# Patient Record
Sex: FEMALE | Race: WHITE
[De-identification: names, ages, dates, MRNs, and addresses within clinical notes are randomized per-mention and may not be internally consistent; named-entity substitution may affect disease eponyms.]

---

## 2019-03-06 ENCOUNTER — HOSPITAL ENCOUNTER (INPATIENT)
Dept: HOSPITAL 97 - ER | Age: 84
LOS: 12 days | Discharge: HOME HEALTH | DRG: 378 | End: 2019-03-18
Attending: FAMILY MEDICINE | Admitting: INTERNAL MEDICINE
Payer: COMMERCIAL

## 2019-03-06 VITALS — BODY MASS INDEX: 19.1 KG/M2

## 2019-03-06 DIAGNOSIS — I69.820: ICD-10-CM

## 2019-03-06 DIAGNOSIS — F32.9: ICD-10-CM

## 2019-03-06 DIAGNOSIS — N18.3: ICD-10-CM

## 2019-03-06 DIAGNOSIS — I65.21: ICD-10-CM

## 2019-03-06 DIAGNOSIS — Z95.0: ICD-10-CM

## 2019-03-06 DIAGNOSIS — K29.70: ICD-10-CM

## 2019-03-06 DIAGNOSIS — M54.16: ICD-10-CM

## 2019-03-06 DIAGNOSIS — K44.9: ICD-10-CM

## 2019-03-06 DIAGNOSIS — Z91.81: ICD-10-CM

## 2019-03-06 DIAGNOSIS — E78.5: ICD-10-CM

## 2019-03-06 DIAGNOSIS — G81.91: ICD-10-CM

## 2019-03-06 DIAGNOSIS — E03.9: ICD-10-CM

## 2019-03-06 DIAGNOSIS — N17.9: ICD-10-CM

## 2019-03-06 DIAGNOSIS — K26.4: Primary | ICD-10-CM

## 2019-03-06 DIAGNOSIS — D62: ICD-10-CM

## 2019-03-06 DIAGNOSIS — G45.9: ICD-10-CM

## 2019-03-06 DIAGNOSIS — K20.9: ICD-10-CM

## 2019-03-06 DIAGNOSIS — I12.9: ICD-10-CM

## 2019-03-06 LAB
BUN BLD-MCNC: 43 MG/DL (ref 7–18)
GLUCOSE SERPLBLD-MCNC: 122 MG/DL (ref 74–106)
HCT VFR BLD CALC: 26.9 % (ref 36–45)
INR BLD: 0.97
LYMPHOCYTES # SPEC AUTO: 1 K/UL (ref 0.7–4.9)
PMV BLD: 8.1 FL (ref 7.6–11.3)
POTASSIUM SERPL-SCNC: 4.3 MMOL/L (ref 3.5–5.1)
RBC # BLD: 2.86 M/UL (ref 3.86–4.86)

## 2019-03-06 PROCEDURE — 82962 GLUCOSE BLOOD TEST: CPT

## 2019-03-06 PROCEDURE — 85730 THROMBOPLASTIN TIME PARTIAL: CPT

## 2019-03-06 PROCEDURE — 99285 EMERGENCY DEPT VISIT HI MDM: CPT

## 2019-03-06 PROCEDURE — 97112 NEUROMUSCULAR REEDUCATION: CPT

## 2019-03-06 PROCEDURE — 81003 URINALYSIS AUTO W/O SCOPE: CPT

## 2019-03-06 PROCEDURE — 70553 MRI BRAIN STEM W/O & W/DYE: CPT

## 2019-03-06 PROCEDURE — 95816 EEG AWAKE AND DROWSY: CPT

## 2019-03-06 PROCEDURE — 93880 EXTRACRANIAL BILAT STUDY: CPT

## 2019-03-06 PROCEDURE — 85025 COMPLETE CBC W/AUTO DIFF WBC: CPT

## 2019-03-06 PROCEDURE — 93306 TTE W/DOPPLER COMPLETE: CPT

## 2019-03-06 PROCEDURE — 85014 HEMATOCRIT: CPT

## 2019-03-06 PROCEDURE — 97124 MASSAGE THERAPY: CPT

## 2019-03-06 PROCEDURE — 97163 PT EVAL HIGH COMPLEX 45 MIN: CPT

## 2019-03-06 PROCEDURE — 71045 X-RAY EXAM CHEST 1 VIEW: CPT

## 2019-03-06 PROCEDURE — 80053 COMPREHEN METABOLIC PANEL: CPT

## 2019-03-06 PROCEDURE — 85610 PROTHROMBIN TIME: CPT

## 2019-03-06 PROCEDURE — 86850 RBC ANTIBODY SCREEN: CPT

## 2019-03-06 PROCEDURE — 36430 TRANSFUSION BLD/BLD COMPNT: CPT

## 2019-03-06 PROCEDURE — 85018 HEMOGLOBIN: CPT

## 2019-03-06 PROCEDURE — 70496 CT ANGIOGRAPHY HEAD: CPT

## 2019-03-06 PROCEDURE — 97116 GAIT TRAINING THERAPY: CPT

## 2019-03-06 PROCEDURE — 70498 CT ANGIOGRAPHY NECK: CPT

## 2019-03-06 PROCEDURE — 36415 COLL VENOUS BLD VENIPUNCTURE: CPT

## 2019-03-06 PROCEDURE — 86900 BLOOD TYPING SEROLOGIC ABO: CPT

## 2019-03-06 PROCEDURE — 93005 ELECTROCARDIOGRAM TRACING: CPT

## 2019-03-06 PROCEDURE — 84132 ASSAY OF SERUM POTASSIUM: CPT

## 2019-03-06 PROCEDURE — 83735 ASSAY OF MAGNESIUM: CPT

## 2019-03-06 PROCEDURE — 86901 BLOOD TYPING SEROLOGIC RH(D): CPT

## 2019-03-06 PROCEDURE — 70450 CT HEAD/BRAIN W/O DYE: CPT

## 2019-03-06 PROCEDURE — 70544 MR ANGIOGRAPHY HEAD W/O DYE: CPT

## 2019-03-06 PROCEDURE — 97166 OT EVAL MOD COMPLEX 45 MIN: CPT

## 2019-03-06 PROCEDURE — 80048 BASIC METABOLIC PNL TOTAL CA: CPT

## 2019-03-06 PROCEDURE — 97530 THERAPEUTIC ACTIVITIES: CPT

## 2019-03-06 PROCEDURE — 82274 ASSAY TEST FOR BLOOD FECAL: CPT

## 2019-03-06 NOTE — RAD REPORT
EXAM DESCRIPTION:  CT - Ct Stroke Brain Wo Cont - 3/6/2019 6:37 pm

 

CLINICAL HISTORY:  aphasia

 

COMPARISON:  None

 

TECHNIQUE:  Computed axial tomography of the head was obtained. IV contrast was not requested.

 

All CT scans are performed using dose optimization technique as appropriate and may include automated
 exposure control or mA/KV adjustment according to patient size.

 

FINDINGS:  An intracranial  bleed is not seen .

 

The ventricles are normal in caliber. Vascular calcifications

 

No extra-axial fluid collection is noted. Moderate to marked low-density areas within periventricular
, deep and subcortical white matter likely represent ischemic changes secondary to small vessel disea
se.

 

Fluid within the sinuses/ mastoids is not seen.

 

IMPRESSION:  No acute intracranial abnormality is seen. If patient's symptoms persist  MRI of the bra
in would be recommended.

 

Luis from the emergency room notified 6:45 p.m. March 6, 2019

## 2019-03-06 NOTE — EDPHYS
Physician Documentation                                                                           

 [facility]                                                                

Name: Medina Fernández                                                                              

Age: 85 yrs                                                                                       

Sex: Female                                                                                       

: 1934                                                                                   

MRN: [id number]                                                                                   

Arrival Date: 2019                                                                          

Time: 18:25                                                                                       

Account#: [id number]                                                                            

Bed 5                                                                                             

Private MD:                                                                                       

ED Physician Darren Menchaca                                                                       

HPI:                                                                                              

                                                                                             

18:28 This 85 yrs old  Female presents to ER via Unassigned with complaints of S/S   kdr 

      of Possible Stroke, Fall Injury.                                                            

18:28 The patient's problem is reported as weakness, that is generalized, Per EMS family      kdr 

      reports episodes of right sided weakness and the patient had a transient episode of         

      Right sided weakness during transport. It was completely resolved on arrival to the ED.     

      She relates that she has been generally weak today. She has no other focal c/o at this      

      time and has an NIH SS of ) on arrival. Onset: The symptoms/episode began/occurred at       

      an unknown time. Duration: The episodes are intermittent. Context: the episode(s) was       

      witnessed. The symptoms are alleviated by nothing. The symptoms are aggravated by           

      moving head, changing position. Associated signs and symptoms: Pertinent positives:         

      weakness. Severity of symptoms: At their worst the symptoms were mild moderate just         

      prior to arrival, in the emergency department the symptoms are unchanged. The patient       

      has experienced similar episodes in the past, several times, The patient states that        

      she has had numerous falls today. States she gets weak and "crumples" to the floor.         

                                                                                                  

Historical:                                                                                       

- Allergies:                                                                                      

18:42 Phenergan;                                                                              aa5 

- PMHx:                                                                                           

18:42 Myocardial infarction; TIA; Thyroid problem;                                            aa5 

- PSHx:                                                                                           

18:42 pacemaker; Hysterectomy; Thyroidectomy;                                                 aa5 

                                                                                                  

- Immunization history:: Adult Immunizations up to date, Flu vaccine is up to date.               

- Ebola Screening: : No symptoms or risks identified at this time.                                

- Social history:: Smoking status: Patient/guardian denies using tobacco,                         

  Patient/guardian denies using alcohol, street drugs.                                            

                                                                                                  

                                                                                                  

ROS:                                                                                              

18:28 Constitutional: Negative for fever, chills, and weight loss, Eyes: Negative for injury, kdr 

      pain, redness, and discharge, ENT: Negative for injury, pain, and discharge, Neck:          

      Negative for injury, pain, and swelling, Cardiovascular: Negative for chest pain,           

      palpitations, and edema, Respiratory: Negative for shortness of breath, cough,              

      wheezing, and pleuritic chest pain, Abdomen/GI: Negative for abdominal pain, nausea,        

      vomiting, diarrhea, and constipation, Back: Negative for injury and pain, : Negative      

      for injury, bleeding, discharge, and swelling, MS/Extremity: Negative for injury and        

      deformity, Skin: Negative for injury, rash, and discoloration, Psych: Negative for          

      depression, anxiety, suicide ideation, homicidal ideation, and hallucinations,              

      Allergy/Immunology: Negative for hives, rash, and allergies, Endocrine: Negative for        

      neck swelling, polydipsia, polyuria, polyphagia, and marked weight changes,                 

      Hematologic/Lymphatic: Negative for swollen nodes, abnormal bleeding, and unusual           

      bruising.                                                                                   

18:28 Neuro: Positive for weakness, Patient states general but EMS/Family report right sided      

      with some speech impairment .                                                               

                                                                                                  

Exam:                                                                                             

18:28 Constitutional:  This is a well developed, well nourished patient who is awake, alert,  kdr 

      and in no acute distress. Head/Face:  Normocephalic, atraumatic. Eyes:  Pupils equal        

      round and reactive to light, extra-ocular motions intact.  Lids and lashes normal.          

      Conjunctiva and sclera are non-icteric and not injected.  Cornea within normal limits.      

      Periorbital areas with no swelling, redness, or edema. ENT:  Nares patent. No nasal         

      discharge, no septal abnormalities noted.  Tympanic membranes are normal and external       

      auditory canals are clear.  Oropharynx with no redness, swelling, or masses, exudates,      

      or evidence of obstruction, uvula midline.  Mucous membranes moist. Neck:  Trachea          

      midline, no thyromegaly or masses palpated, and no cervical lymphadenopathy.  Supple,       

      full range of motion without nuchal rigidity, or vertebral point tenderness.  No            

      Meningismus. Chest/axilla:  Normal chest wall appearance and motion.  Nontender with no     

      deformity.  No lesions are appreciated. Cardiovascular:  Regular rate and rhythm with a     

      normal S1 and S2.  No gallops, murmurs, or rubs.  Normal PMI, no JVD.  No pulse             

      deficits. Respiratory:  Lungs have equal breath sounds bilaterally, clear to                

      auscultation and percussion.  No rales, rhonchi or wheezes noted.  No increased work of     

      breathing, no retractions or nasal flaring. Abdomen/GI:  Soft, non-tender, with normal      

      bowel sounds.  No distension or tympany.  No guarding or rebound.  No evidence of           

      tenderness throughout. Back:  No spinal tenderness.  No costovertebral tenderness.          

      Full range of motion. Skin:  Warm, dry with normal turgor.  Normal color with no            

      rashes, no lesions, and no evidence of cellulitis.  She has minor abrasions on her legs     

      MS/ Extremity:  Pulses equal, no cyanosis.  Neurovascular intact.  Full, normal range       

      of motion. Neuro:  Awake and alert, GCS 15, oriented to person, place, time, and            

      situation.  Cranial nerves II-XII grossly intact.  Motor strength 5/5 in all                

      extremities.  Sensory grossly intact.  Cerebellar exam normal.  Normal gait. Psych:         

      Awake, alert, with orientation to person, place and time.  Behavior, mood, and affect       

      are within normal limits.                                                                   

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 no acute findings on CT.                      ps1 

                                                                                                  

Vital Signs:                                                                                      

18:35  / 76; Pulse 77; Resp 16 S; Temp 97.7(O); Pulse Ox 100% on R/A; Pain 6/10;        aa5 

19:00  / 57; Pulse 74; Resp 15; Pulse Ox 99% ;                                          rr5 

19:35  / 51; Pulse 77; Resp 12; Pulse Ox 98% ;                                          rr5 

20:30  / 70; Pulse 75; Resp 16; Pulse Ox 99% ;                                          rr5 

21:30  / 65; Pulse 79; Resp 17; Pulse Ox 99% ;                                          rr5 

22:30  / 57; Pulse 92; Resp 19; Pulse Ox 98% ;                                          rr5 

23:30  / 63; Pulse 100; Resp 17; Pulse Ox 99% ;                                         rr5 

03/07                                                                                             

00:00  / 60; Pulse 87; Resp 17; Temp 98; Pulse Ox 100% ;                                rr5 

                                                                                                  

NIH Stroke Scale Scores:                                                                          

0306                                                                                             

18:25 NIHSS Score: 0                                                                          aa5 

18:28 NIHSS Score: 0                                                                          kdr 

                                                                                                  

MDM:                                                                                              

18:28 Data reviewed: vital signs, nurses notes.                                               kdr 

19:43 ED course: patient signed out to me at shift change by Dr. Menchaca. NIH 0. Reported     ps1 

      syncope vs seizure vs stroke vs Nabor's. Reportedly slumped over and appeared to become      

      syncopal then had aphasia and right UE weakness now completely resolved. TPA not given      

      2/2 improving NIH. Kandace on call and stroke MRI being performed. .                       

19:52 Patient medically screened.                                                             ps1 

20:09 ED course: Notified that patient has a pacemaker that is not compatible with MRI. Will  ps1 

      order CTA head and neck. .                                                                  

                                                                                                  

                                                                                             

18:32 Order name: Basic Metabolic Panel; Complete Time: 19:52                                                                                                                              

18:32 Order name: CBC with Diff; Complete Time: 19:52                                                                                                                                      

18:32 Order name: Protime (+inr); Complete Time: 19:52                                                                                                                                     

18:32 Order name: Ptt, Activated; Complete Time: 19:52                                                                                                                                     

18:32 Order name: CT Stroke Brain w/o Contrast; Complete Time: 19:00                                                                                                                       

18:32 Order name: Stroke CXR 1 View; Complete Time: 19:52                                                                                                                                  

18:32 Order name: EKG; Complete Time: 18:33                                                                                                                                                

18:32 Order name: Accucheck; Complete Time: 18:33                                                                                                                                          

19:39 Order name: MRA Head Wo Cont                                                            EDMS

                                                                                             

19:39 Order name: Brain W/Wo Cont                                                             EDMS

                                                                                             

19:39 Order name: MRA Neck W/Wo Cont                                                          EDMS

                                                                                             

20:12 Order name: CT Neck Angio; Complete Time: 21:18                                         ps1 

                                                                                             

20:12 Order name: CT Head Angio; Complete Time: 21:18                                         ps1 

                                                                                             

18:32 Order name: Cardiac monitoring; Complete Time: 18:41                                                                                                                                 

18:32 Order name: EKG - Nurse/Tech; Complete Time: 19:01                                                                                                                                   

18:32 Order name: IV Saline Lock; Complete Time: 18:41                                                                                                                                     

18:32 Order name: Labs collected and sent; Complete Time: 18:41                                                                                                                            

18:32 Order name: NPO; Complete Time: 18:41                                                                                                                                                

18:32 Order name: O2 Per Protocol; Complete Time: 18:41                                                                                                                                    

18:32 Order name: O2 Sat Monitoring; Complete Time: 18:41                                                                                                                                  

18:32 Order name: Stroke Swallow Screen; Complete Time: 19:01                                 aa5 

                                                                                                  

Administered Medications:                                                                         

No medications were administered                                                                  

                                                                                                  

                                                                                                  

Point of Care Testing:                                                                            

      Blood Glucose:                                                                              

18:33 Blood Glucose: 144 mg/dL;                                                               bp  

      Ranges:                                                                                     

      Critical Glucose Levels:Adult <50 mg/dl or >400 mg/dl  <40 mg/dl or >180 mg/dl       

Disposition:                                                                                      

19 21:55 Hospitalization ordered by Danielle Brand for Inpatient Admission. Preliminary    

  diagnosis is TIA.                                                                               

- Bed requested for Telemetry/MedSurg (Inpatient).                                                

- Status is Inpatient Admission.                                                              rr5 

- Condition is Stable.                                                                            

- Problem is new.                                                                                 

- Symptoms are resolved.                                                                          

UTI on Admission? No                                                                              

                                                                                                  

                                                                                                  

                                                                                                  

                                                                                                  

NIH Stroke Scale - NIH Stroke Score                                                               

Date: 2019                                                                                  

Time: 18:25                                                                                       

Total Score = 0                                                                                   

  1a. Level of Consciousness (LOC) - 0(Alert)                                                     

  1b. Level of Consciousness (LOC) (Year \T\ Age) - 0(Both)                                       

  1c. LOC Commands (Open \T\ Closes Eyes/) - 0(Both)                                          

   2. Best Gaze (Lateral Gaze Paresis) - 0(Normal)                                                

   3. Visual Field Loss - 0(No visual loss)                                                       

   4. Facial Palsy - 0(Normal)                                                                    

  5a. Left Arm: Motor (10-second hold) - 0(No drift)                                              

  5b. Right Arm: Motor (10-second hold) - 0(No drift)                                             

  6a. Left Leg: Motor (5-second hold - always test supine) - 0(No drift)                          

  6b. Right Leg: Motor (5-second hold - always test supine) - 0(No drift)                         

   7. Limb Ataxia (finger/nose \T\ heel/shin - test with eyes open) - 0(Absent)                   

   8. Sensory Loss (pinprick arms/legs/face) - 0(Normal)                                          

   9. Best Language: Aphasia (description/naming/reading) - 0(No aphasia)                         

  10. Dysarthria (speech clarity - read or repeat words) - 0(Normal)                              

  11. Extinction and Inattention (visual/tactile/auditory/spatial/personal) - 0(No                

      abnormality)                                                                                

Initials: aa5                                                                                     

                                                                                                  

                                                                                                  

NIH Stroke Scale - NIH Stroke Score                                                               

Date: 2019                                                                                  

Time: 18:28                                                                                       

Total Score = 0                                                                                   

  1a. Level of Consciousness (LOC) - 0(Alert)                                                     

  1b. Level of Consciousness (LOC) (Year \T\ Age) - 0(Both)                                       

  1c. LOC Commands (Open \T\ Closes Eyes/) - 0(Both)                                          

   2. Best Gaze (Lateral Gaze Paresis) - 0(Normal)                                                

   3. Visual Field Loss - 0(No visual loss)                                                       

   4. Facial Palsy - 0(Normal)                                                                    

  5a. Left Arm: Motor (10-second hold) - 0(No drift)                                              

  5b. Right Arm: Motor (10-second hold) - 0(No drift)                                             

  6a. Left Leg: Motor (5-second hold - always test supine) - 0(No drift)                          

  6b. Right Leg: Motor (5-second hold - always test supine) - 0(No drift)                         

   7. Limb Ataxia (finger/nose \T\ heel/shin - test with eyes open) - 0(Absent)                   

   8. Sensory Loss (pinprick arms/legs/face) - 0(Normal)                                          

   9. Best Language: Aphasia (description/naming/reading) - 0(No aphasia)                         

  10. Dysarthria (speech clarity - read or repeat words) - 0(Normal)                              

  11. Extinction and Inattention (visual/tactile/auditory/spatial/personal) - 0(No                

      abnormality)                                                                                

Initials: kdr                                                                                     

                                                                                                  

Signatures:                                                                                       

Dispatcher [employer]                           EDMS                                                 

Darren Menchaca MD MD   kdr                                                  

Marielle Dunn RN                     RN   aa5                                                  

Evi Hull RN                       RN   cg                                                   

Jani West MD MD   ps1                                                  

[place] RN                      RN   rr5                                                  

                                                                                                  

Corrections: (The following items were deleted from the chart)                                    

19:39 19:02 MR STROKE PROTOCOL+MRI.RAD.BRZ ordered. [facility]        

23:51 21:55 Hospitalization Ordered by Danielle Brand MD for Inpatient Admission.     cg          

      Preliminary diagnosis is TIA. Bed requested for Telemetry/MedSurg (Inpatient).              

      Status is Inpatient Admission. Condition is Stable. Problem is new. Symptoms                

      are resolved. UTI on Admission? No. ps1                                                     

                                                                                             

01:41 06 23:51 2019 21:55 Hospitalization Ordered by Danielle Brand MD for   rr5         

      Inpatient Admission. Preliminary diagnosis is TIA. Bed requested for                        

      Telemetry/MedSurg (Inpatient). Status is Inpatient Admission. Condition is                  

      Stable. Problem is new. Symptoms are resolved. UTI on Admission? No. cg                     

                                                                                                  

**************************************************************************************************

## 2019-03-06 NOTE — RAD REPORT
EXAM DESCRIPTION:  Chad Angio3/6/2019 8:28 pm

 

CLINICAL HISTORY:  Syncope

Right-sided weakness /aphasia

 

COMPARISON:  None

 

TECHNIQUE:  50 cc Isovue 370 was administered intravenously. 3D MIP reconstruction performed

 

All CT scans are performed using dose optimization technique as appropriate and may include automated
 exposure control or mA/KV adjustment according to patient size.

 

FINDINGS:   Severe calcified plaque is present within the right carotid bulb. .

 

Moderate plaque is present within the right external carotid artery.

 

Mild plaque is present within common carotid and left internal carotid arteries.

 

The left vertebral artery is dominant. Distal left vertebral artery is calcified.

 

IMPRESSION:   Severe stenosis right carotid bulb.

 

 

NASCET criteria used.

 

Mild  0-49% stenosis

Moderate 50-69% stenosis

Severe 70-99% stenosis

## 2019-03-06 NOTE — RAD REPORT
EXAM DESCRIPTION:  Elvin Single View3/6/2019 6:50 pm

 

CLINICAL HISTORY:  Chest pain

 

COMPARISON:  None

 

FINDINGS:   The lungs appear clear of acute infiltrate. The heart is mildly enlarged. Pacemaker leads
 are in place.

 

IMPRESSION:   No acute abnormalities displayed

## 2019-03-06 NOTE — RAD REPORT
EXAM DESCRIPTION:  CTHead angio3/6/2019 8:32 pm

 

CLINICAL HISTORY:  Right-sided weakness /aphasia

 

COMPARISON:  None

 

TECHNIQUE:  CT angiogram of the head was obtained. 3D MIPS reconstruction performed.

 

All CT scans are performed using dose optimization technique as appropriate and may include automated
 exposure control or mA/KV adjustment according to patient size.

 

FINDINGS:  Coarse calcifications are present within the distal internal carotid arteries bilaterally.


 

The basilar, anterior cerebral, middle cerebral and posterior cerebral arteries are normal caliber. A
n aneurysm is not seen.

 

A significant stenosis is not noted.

 

IMPRESSION:  A significant stenosis/ occlusion is not seen

## 2019-03-06 NOTE — ER
Nurse's Notes                                                                                     

 [facility]                                                                

Name: Medina Fernández                                                                              

Age: 85 yrs                                                                                       

Sex: Female                                                                                       

: 1934                                                                                   

MRN: [id number]                                                                                   

Arrival Date: 2019                                                                          

Time: 18:25                                                                                       

Account#: [id number]                                                                            

Bed 5                                                                                             

Private MD:                                                                                       

Diagnosis: TIA                                                                                    

                                                                                                  

Presentation:                                                                                     

                                                                                             

18:25 Presenting complaint: Patient states: "I have fallen several times today about 5 to 6   aa5 

      times". Pt states "When I fell my knees just gave out and I fell straight to my butt        

      and I couldn't get up, I had to crawl through the house". EMS reports pt had right          

      sided paralysis and expressive aphasia at 1730 and resolved prior to scene arrival, pt      

      also had bowel incontinence during episode.                                                 

18:25 Acuity: JAVAD 2                                                                           aa5 

18:25 Transition of care: patient was not received from another setting of care.              aa5 

18:25 Method Of Arrival: EMS: [facility]                                                      aa5 

18:35 The patients blood glucose was checked before arriving to the hospital and was found to aa5 

      be normal. Onset of symptoms was 2019. Risk Assessment: Do you want to hurt       

      yourself or someone else? Patient reports no desire to harm self or others. Initial         

      Sepsis Screen: Does the patient meet any 2 criteria? No. Patient's initial sepsis           

      screen is negative. Does the patient have a suspected source of infection? No.              

      Patient's initial sepsis screen is negative. Care prior to arrival: IV initiated. 20        

      GA, in the left antecubital area, Glucose check: 110.                                       

19:30 No acute neurological deficit is noted.                                                 rr5 

                                                                                                  

Triage Assessment:                                                                                

19:00 The onset of the patients symptoms was 2019 at 17:30.                         rr5 

19:00 General: Appears in no apparent distress. comfortable, Behavior is calm, cooperative,   rr5 

      appropriate for age.                                                                        

                                                                                                  

Stroke Activation: Symptom onset < 3 hours                                                        

 Physician: Stroke Attending; Name: ; Notified At: ; Arrived At:                                  

 Physician: Chief Stroke Resident; Name: ; Notified At: ; Arrived At:                             

 Physician: Stroke Resident; Name: ; Notified At: ; Arrived At:                                   

 Physician: ED Attending; Name: ; Notified At: ; Arrived At:                                      

 Physician: ED Resident; Name: ; Notified At: ; Arrived At:                                       

                                                                                                  

Historical:                                                                                       

- Allergies:                                                                                      

18:42 Phenergan;                                                                              aa5 

- PMHx:                                                                                           

18:42 Myocardial infarction; TIA; Thyroid problem;                                            aa5 

- PSHx:                                                                                           

18:42 pacemaker; Hysterectomy; Thyroidectomy;                                                 aa5 

                                                                                                  

- Immunization history:: Adult Immunizations up to date, Flu vaccine is up to date.               

- Ebola Screening: : No symptoms or risks identified at this time.                                

- Social history:: Smoking status: Patient/guardian denies using tobacco,                         

  Patient/guardian denies using alcohol, street drugs.                                            

                                                                                                  

                                                                                                  

Screenin:00 Abuse screen: Denies threats or abuse. Denies injuries from another. Nutritional        rr5 

      screening: No deficits noted. Tuberculosis screening: No symptoms or risk factors           

      identified. Fall Risk Fall in past 12 months (25 points). IV access (20 points). Total      

      Smart Fall Scale indicates Low Risk Score (25-44 pts). Fall prevention measures have        

      been instituted. Side Rails Up X 2 Frequent Obs/Assesments occuring Family Present and      

      informed to notify staff if they need to leave bedside As available Patient and Family      

      Educated on Fall Prevention Program and strategies.                                         

                                                                                                  

Assessment:                                                                                       

18:25 VAN Scoring: Arm Drift: Patients demonstrates NO arm weakness. Patient is VAN Negative. aa5 

18:25 General: Appears comfortable, Behavior is calm, cooperative. Pain: Complains of pain in aa5 

      neck and back Pain does not radiate. Pain currently is 6 out of 10 on a pain scale.         

      Quality of pain is described as aching, Pain began today Is continuous. Neuro: Level of     

      Consciousness is awake, alert, obeys commands, Oriented to person, place, time,             

      situation,  are weak bilaterally Moves all extremities. Speech is normal, Facial       

      symmetry appears normal, Pupils are PERRLA, Denies paresthesias numbness Reports            

      generalized weakness since this morning. . Cardiovascular: Heart tones S1 S2 present        

      Rhythm is sinus rhythm. Respiratory: Airway is patent Respiratory effort is even,           

      unlabored, Respiratory pattern is regular, symmetrical. GI: No signs and/or symptoms        

      were reported involving the gastrointestinal system. : Reports bowel incontinence         

      episode today. EENT: No signs and/or symptoms were reported regarding the EENT system.      

      Derm: Skin is pink, warm \T\ dry. Small skin tear noted to right shin, no active bleeding   

      noted. Musculoskeletal: Range of motion: intact in all extremities.                         

18:28 Reassessment: Pt taken to CT via stretcher .                                            aa5 

18:42 T-PA (Activase) Screening: Indications: No evidence of intracranial hemorrhage or CT of aa5 

      head and no evidence of peripheral hemorrhage or recent CVA: Yes.                           

18:47 The patient has not been NPO before screening. The patient is alert, and able to follow aa5 

      commands. The patient does not exhibit slurred or garbled speech. The patient is not        

      exhibiting difficulty speaking. The patient does not exhibit difficulty understanding       

      words. The patient is able to swallow own secretions with no drooling or need for           

      suction. Patient tolerated one teaspoon of water. No drooling, immediate coughing,          

      gurgling, or clearing of the throat was noted. The patient tolerated 90mL of water. No      

      drooling, immediate coughing, gurgling, or clearing of the throat was noted. The            

      patient passed the bedside swallow screening. Oral medications may be given as ordered.     

      Contact Physician for further diet orders. Provider notified of bedside swallow             

      screening results: Darren Menchaca MD.                                                        

20:10 Reassessment: sue MRI staff verified for the pacemaker provider. patient cannot     rr5 

      proceed to MRI, pacemaker is not compatible. ED provider aware.                             

21:45 Reassessment: Patient appears in no apparent distress at this time. Patient is alert,   rr5 

      oriented x 3, equal unlabored respirations, skin warm/dry/pink. no complaints made.         

      patient is for transfer to other facility the patient and  agreed. Patient         

      states feeling better. Patient states symptoms have improved.                               

22:30 Reassessment: Patient appears in no apparent distress at this time. Patient is alert,   rr5 

      oriented x 3, equal unlabored respirations, skin warm/dry/pink. awaiting for room           

      assignment, no complaints made.                                                             

23:30 Reassessment: Patient appears in no apparent distress at this time. dr. carrillo        rr5 

      informed diaper changed black tarry stool noted.                                            

                                                                                             

00:55 Reassessment: Patient appears in no apparent distress at this time. Patient is alert,   rr5 

      oriented x 3, equal unlabored respirations, skin warm/dry/pink. no complaints made.         

      asleep on bed comfortably.                                                                  

                                                                                                  

Vital Signs:                                                                                      

                                                                                             

18:35  / 76; Pulse 77; Resp 16 S; Temp 97.7(O); Pulse Ox 100% on R/A; Pain 6/10;        aa5 

19:00  / 57; Pulse 74; Resp 15; Pulse Ox 99% ;                                          rr5 

19:35  / 51; Pulse 77; Resp 12; Pulse Ox 98% ;                                          rr5 

20:30  / 70; Pulse 75; Resp 16; Pulse Ox 99% ;                                          rr5 

21:30  / 65; Pulse 79; Resp 17; Pulse Ox 99% ;                                          rr5 

22:30  / 57; Pulse 92; Resp 19; Pulse Ox 98% ;                                          rr5 

23:30  / 63; Pulse 100; Resp 17; Pulse Ox 99% ;                                         rr5 

03                                                                                             

00:00  / 60; Pulse 87; Resp 17; Temp 98; Pulse Ox 100% ;                                rr5 

                                                                                                  

NIH Stroke Scale Scores:                                                                          

                                                                                             

18:25 NIHSS Score: 0                                                                          aa5 

18:28 NIHSS Score: 0                                                                          kdr 

                                                                                                  

ED Course:                                                                                        

18:25 Patient arrived in ED.                                                                  ds1 

18:25 Arm band placed on Patient placed in an exam room, on a stretcher.                      aa5 

18:25 Patient has correct armband on for positive identification. Placed in gown. Bed in low  aa5 

      position. Side rails up X2.                                                                 

18:27 Initial lab(s) drawn, by ED staff, sent to lab.                                         aa5 

18:27 No provider procedures requiring assistance completed. Maintain EMS IV.                 aa5 

18:28 Darren Menchaca MD is Attending Physician.                                              kdr 

18:31 Basim Hanna, DURGA is Primary Nurse.                                                    bp  

18:36 Triage completed.                                                                       aa5 

18:37 CT Stroke Brain w/o Contrast In Process Unspecified.                                    EDMS

18:40 Primary Nurse role handed off by Basim Hanna, DUGRA                                     aa5 

18:40 Marielle Dunn, RN is Primary Nurse.                                                   aa5 

18:51 Stroke CXR 1 View In Process Unspecified.                                               EDMS

19:09 Report given to El RN and Keila RN.                                             aa5 

20:14 Patient moved to CT.                                                                    jg6 

20:29 CT completed. Patient tolerated procedure well. Patient moved back from CT.             nj  

20:29 CT Neck Angio In Process Unspecified.                                                   EDMS

20:29 CT Head Angio In Process Unspecified.                                                   EDMS

21:55 Danielle Brand MD is Hospitalizing Provider.                                          ps1 

                                                                                             

00:30 Patient admitted, IV remains in place. intact, No redness/swelling at site.             rr5 

                                                                                                  

Administered Medications:                                                                         

No medications were administered                                                                  

                                                                                                  

                                                                                                  

Point of Care Testing:                                                                            

      Blood Glucose:                                                                              

                                                                                             

18:33 Blood Glucose: 144 mg/dL;                                                               bp  

      Ranges:                                                                                     

                                                                                                  

Intake:                                                                                           

23:25 dr. carrillo aware black tarry stool                                                    rr5 

                                                                                                  

Output:                                                                                           

23:25 Other: 1 (Diapers) ; Total: 0ml.                                                        rr5 

23:25 dr. carrillo aware black tarry stool                                                    rr5 

                                                                                                  

Outcome:                                                                                          

21:55 Decision to Hospitalize by Provider.                                                    ps1 

                                                                                             

00:29 Admitted to Med/surg accompanied by tech, via stretcher, room 231, with chart, Report   rr5 

      called to  sandra                                                                          

      Condition: stable                                                                           

      Instructed on the need for admit.                                                           

01:41 Patient left the ED.                                                                    rr5 

                                                                                                  

                                                                                                  

NIH Stroke Scale - NIH Stroke Score                                                               

Date: 2019                                                                                  

Time: 18:25                                                                                       

Total Score = 0                                                                                   

  1a. Level of Consciousness (LOC) - 0(Alert)                                                     

  1b. Level of Consciousness (LOC) (Year \T\ Age) - 0(Both)                                       

  1c. LOC Commands (Open \T\ Closes Eyes/) - 0(Both)                                          

   2. Best Gaze (Lateral Gaze Paresis) - 0(Normal)                                                

   3. Visual Field Loss - 0(No visual loss)                                                       

   4. Facial Palsy - 0(Normal)                                                                    

  5a. Left Arm: Motor (10-second hold) - 0(No drift)                                              

  5b. Right Arm: Motor (10-second hold) - 0(No drift)                                             

  6a. Left Leg: Motor (5-second hold - always test supine) - 0(No drift)                          

  6b. Right Leg: Motor (5-second hold - always test supine) - 0(No drift)                         

   7. Limb Ataxia (finger/nose \T\ heel/shin - test with eyes open) - 0(Absent)                   

   8. Sensory Loss (pinprick arms/legs/face) - 0(Normal)                                          

   9. Best Language: Aphasia (description/naming/reading) - 0(No aphasia)                         

  10. Dysarthria (speech clarity - read or repeat words) - 0(Normal)                              

  11. Extinction and Inattention (visual/tactile/auditory/spatial/personal) - 0(No                

      abnormality)                                                                                

Initials: aa5                                                                                     

                                                                                                  

                                                                                                  

NIH Stroke Scale - NIH Stroke Score                                                               

Date: 2019                                                                                  

Time: 18:28                                                                                       

Total Score = 0                                                                                   

  1a. Level of Consciousness (LOC) - 0(Alert)                                                     

  1b. Level of Consciousness (LOC) (Year \T\ Age) - 0(Both)                                       

  1c. LOC Commands (Open \T\ Closes Eyes/) - 0(Both)                                          

   2. Best Gaze (Lateral Gaze Paresis) - 0(Normal)                                                

   3. Visual Field Loss - 0(No visual loss)                                                       

   4. Facial Palsy - 0(Normal)                                                                    

  5a. Left Arm: Motor (10-second hold) - 0(No drift)                                              

  5b. Right Arm: Motor (10-second hold) - 0(No drift)                                             

  6a. Left Leg: Motor (5-second hold - always test supine) - 0(No drift)                          

  6b. Right Leg: Motor (5-second hold - always test supine) - 0(No drift)                         

   7. Limb Ataxia (finger/nose \T\ heel/shin - test with eyes open) - 0(Absent)                   

   8. Sensory Loss (pinprick arms/legs/face) - 0(Normal)                                          

   9. Best Language: Aphasia (description/naming/reading) - 0(No aphasia)                         

  10. Dysarthria (speech clarity - read or repeat words) - 0(Normal)                              

  11. Extinction and Inattention (visual/tactile/auditory/spatial/personal) - 0(No                

      abnormality)                                                                                

Initials: kdr                                                                                     

                                                                                                  

Signatures:                                                                                       

Dispatcher [employer]                           EDMS                                                 

Darren Menchaca MD MD   kdr                                                  

Yissel Atkinson                                ds1                                                  

Marielle Dunn, RN                     RN   aa5                                                  

Catrachito Johns Brian, RN                      RN   bp                                                   

Jani West MD MD   ps1                                                  

Marly Hull6                                                  

El Francisco RN                      RN   rr5                                                  

                                                                                                  

**************************************************************************************************

## 2019-03-07 LAB
BUN BLD-MCNC: 34 MG/DL (ref 7–18)
GLUCOSE SERPLBLD-MCNC: 101 MG/DL (ref 74–106)
HCT VFR BLD CALC: 25.6 % (ref 36–45)
LYMPHOCYTES # SPEC AUTO: 1.4 K/UL (ref 0.7–4.9)
PMV BLD: 8.1 FL (ref 7.6–11.3)
POTASSIUM SERPL-SCNC: 3.8 MMOL/L (ref 3.5–5.1)
RBC # BLD: 2.71 M/UL (ref 3.86–4.86)

## 2019-03-07 RX ADMIN — HYDRALAZINE HYDROCHLORIDE PRN MG: 10 TABLET, FILM COATED ORAL at 03:29

## 2019-03-07 RX ADMIN — HYDRALAZINE HYDROCHLORIDE PRN MG: 10 TABLET, FILM COATED ORAL at 21:16

## 2019-03-07 RX ADMIN — SODIUM CHLORIDE SCH MG: 0.9 INJECTION, SOLUTION INTRAVENOUS at 21:15

## 2019-03-07 RX ADMIN — SODIUM CHLORIDE SCH MLS: 0.9 INJECTION, SOLUTION INTRAVENOUS at 10:59

## 2019-03-07 RX ADMIN — SODIUM CHLORIDE SCH MLS: 0.9 INJECTION, SOLUTION INTRAVENOUS at 01:27

## 2019-03-07 RX ADMIN — Medication SCH ML: at 21:15

## 2019-03-07 RX ADMIN — SODIUM CHLORIDE SCH MG: 0.9 INJECTION, SOLUTION INTRAVENOUS at 09:03

## 2019-03-07 RX ADMIN — Medication SCH ML: at 09:00

## 2019-03-07 RX ADMIN — ATORVASTATIN CALCIUM SCH MG: 40 TABLET, FILM COATED ORAL at 21:16

## 2019-03-07 RX ADMIN — SODIUM CHLORIDE SCH: 0.9 INJECTION, SOLUTION INTRAVENOUS at 20:59

## 2019-03-07 NOTE — RAD REPORT
EXAM DESCRIPTION:  US - CP - 3/7/2019 8:27 am

 

CLINICAL HISTORY:  Carotid stenosis

 

COMPARISON:  CT angio neck March 6, 2019

 

TECHNIQUE:  Real-time sonographic evaluation of both carotid systems was performed. Gray scale and Do
ppler interrogation were performed with waveform tracing bilaterally.

 

FINDINGS:  Normal high resistance waveforms are noted in both external carotid arteries. The common c
arotid arteries and internal carotid arteries show normal low resistance waveforms.

 

Patient shows very pronounced calcified plaquing change in the right carotid bulb extending into the 
proximal portions of the right internal carotid artery. Significant luminal narrowing is evident on v
isual inspection. Calcified plaquing in the left carotid bulb is present without significant luminal 
narrowing. No dissection is identified. Right internal carotid artery peak systolic velocity reaches 
213 cm/second with a 53 cm/second end-diastolic velocity. A 2.5 right-side ICA/CCA ratio was obtained
. Right external carotid velocity reached 285 cm/second although an external stenosis is generally no
t clinically significant. Left-sided carotid peak velocity reaches 117 cm/seconds. A 1.1 left ICA/ CC
A ratio was obtained. Left external carotid velocity is elevated at 222 cm/second.

 

Antegrade flow seen in both vertebral arteries.

 

Velocity values and ratios were recorded and are retained in the patient's imaging records.

 

 

IMPRESSION:  Significant densely calcified plaquing changes in the right carotid bulb and proximal IC
A. Stenosis is estimated at 80- 90%.

 

Left-sided carotid bulb calcified plaquing changes not causing significant stenosis.

 

Bilateral external carotid stenoses. External carotid stenoses are generally not clinically significa
nt.

## 2019-03-07 NOTE — P.HP
Certification for Inpatient


Patient admitted to: Observation


With expected LOS: <2 Midnights


Practitioner: I am a practitioner with admitting privileges, knowledge of 

patient current condition, hospital course, and medical plan of care.


Services: Services provided to patient in accordance with Admission 

requirements found in Title 42 Section 412.3 of the Code of Federal Regulations





Patient History


Date of Service: 03/07/19


Reason for admission: recurrent fall, possible TIA, melena


History of Present Illness: 





Ms Fernández is an 85 years old woman with history of HTN, TIA, who report that 

she fell about 5-6 times today, unable to state wether she loss her conscious 

or not, she is mildly confused. She states that her right knee gave up. No 

history of fever or chills. When EMS arrived, found the patient with right side 

hemiparesis and expressive aphasia, but all her symptoms were resolved before 

arrive to ED. CT/CTA head shows no acute abnormalities. CTA neck report severe 

stenosis on right carotid bulb. Also during her stay in ED it was found that 

she has melena. Her hgb is 9.3 mg/dl, last value documented was in 2015 about 

13.2 mg/dl.


Allergies





promethazine [From Phenergan] Allergy (Verified 03/07/19 01:42)


 Itching/Hives/Rash





Home medications list reviewed: Yes





- Past Medical/Surgical History


Has patient received pneumonia vaccine in the past: Yes


Diabetic: No


-: HTN


-: MI


-: TIA


-: Hyperthyroidism


-: Pacemaker placement


-: Hysterectomy


-: Thyroidectomy





- Family History


  ** Mother


-: Cancer


Notes: colon CA





  ** Father


-: Cancer


Notes: liver CA





  ** son


-: Cancer


Notes: liver CA





- Social History


Smoking Status: Never smoker


Alcohol use: No


CD- Drugs: No


Caffeine use: Yes


Place of Residence: Home





Review of Systems


10-point ROS is otherwise unremarkable





Physical Examination





- Vital Signs


Temperature: 98.5 F


Blood Pressure: 179/74


Pulse: 85


Respirations: 18


Pulse Ox (%): 100





- Physical Exam


General: Alert, In no apparent distress, Confused


HEENT: Atraumatic, PERRLA, Mucous membr. moist/pink, EOMI, Sclerae nonicteric


Neck: Supple, 2+ carotid pulse no bruit, No LAD, Without JVD or thyroid 

abnormality


Respiratory: Clear to auscultation bilaterally, Normal air movement


Cardiovascular: Regular rate/rhythm, Normal S1 S2


Gastrointestinal: Normal bowel sounds, No tenderness


Musculoskeletal: No tenderness


Integumentary: No rashes


Neurological: Normal speech, Normal strength at 5/5 x4 extr, Normal tone, 

Normal affect


Lymphatics: No axilla or inguinal lymphadenopathy





- Studies


Laboratory Data (last 24 hrs)





03/06/19 19:02: PT 11.5, INR 0.97, APTT 26.7


03/06/19 19:02: WBC 11.1 H, Hgb 9.3 L, Hct 26.9 L, Plt Count 518 H


03/06/19 19:02: Sodium 137, Potassium 4.3, BUN 43 H, Creatinine 1.35 H, Glucose 

122 H








Assessment and Plan





- Problems (Diagnosis)


(1) Recurrent falls


Current Visit: Yes   Status: Acute   





(2) TIA (transient ischemic attack)


Current Visit: Yes   Status: Acute   





(3) GIB (gastrointestinal bleeding)


Current Visit: Yes   Status: Acute   





(4) CKD (chronic kidney disease)


Current Visit: Yes   Status: Acute   


Qualifiers: 


   Chronic kidney disease stage: stage 3 (moderate)   Qualified Code(s): N18.3 

- Chronic kidney disease, stage 3 (moderate)   





(5) Anemia


Current Visit: Yes   Status: Acute   


Qualifiers: 


   Anemia type: unspecified type   Qualified Code(s): D64.9 - Anemia, 

unspecified   





- Plan





The patient will be admitted to the hospital due to recurrent falls, possible 

TIA, unfortunately the patient has pacemaker placement, not able to perform 

MRI. She has also melena with anemia. Will Keep the patient NPO, order IV fluids

, IV protonix, (hold anticoagulants and antiplatelets until GI evaluation. 

Consult Dr Raines and Dr Jeronimo.





- Advance Directives


Does patient have a Living Will: Yes


Does patient have a Durable POA for Healthcare: Yes





- Code Status/Comfort Care


Code Status Assessed: Yes


Code Status: Full Code

## 2019-03-07 NOTE — PN
Date of Progress Note:  03/07/2019



Subjective:  The patient seen and examined, chart reviewed and case discussed 
with RN and Dr. Jeronimo.  The patient reports weakness in her left leg that 
seems to give out.  Denies any chronic back pain or recent trauma.  The patient 
does use a walker at home.



Medication:  List reviewed.



Code Status:  Full.



Objective:  Vital Signs:  Temperature 98, heart rate 82, blood pressure 158/69, 
respirations 18, O2 98% on room air. 

General:  awake, alert, oriented x3.  Elderly female, ill-appearing. 

CV:  S1, S2.  Regular rate and rhythm.  Peripheral pulses present. 

Respiratory:  Clear to auscultation bilaterally.  No wheezing or stridor.  No 
use of accessory muscles Gastrointestinal:  Abdomen is soft, nontender, 
nondistended.  Positive bowel sounds.  No guarding or rigidity. 

Extremities:  No clubbing, cyanosis, or edema. 

Neuro:  Cranial nerves 2 through 12 intact grossly.  No focal neurological 
deficit.  Speech is normal.  Strength is symmetric in bilateral upper and lower 
extremities.  Sensation intact to light touch.



Laboratory Data:  WBC 9, H and H 9.1, 25.6; platelets 493.  Sodium 141, 
potassium 3.8, chloride 108, CO2 26, BUN 34, creatinine 1.19, glucose 101, 
calcium 8.4.  Occult blood was negative.  Echocardiogram, EF 51%.  No wall 
motion abnormality.  Carotid artery ultrasound shows densely calcified plaquing 
changes in the right carotid bulb and proximal ICA stenosis estimated at 80-90%
.  Left-sided carotid bulb calcified plaquing changes not causing significant 
stenosis.  Bilateral external carotid stenosis, external carotid stenosis, 
generally not clinically significant.  CT angio of the neck shows severe 
stenosis of the right carotid bulb.  CT angio of the head shows no significant 
stenosis or occlusion.  CT scan of the brain shows no acute intracranial 
abnormality.  The patient unable to have MRI done due to her pacemaker.



Assessment And Plan:  An 85-year-old female with:

1.   Recurrent falls, likely has lumbar radiculopathy.  Unfortunately, due to 
her pacemaker, we are unable to do an MRI.  We will obtain Neurology 
consultation, place on fall precautions and have PT work with her.

2.   Transient ischemic attack.  CT scan of the brain does not show any acute 
abnormalities.  Unable to do MRI of the brain.

3.   Melena.  The patient had melanotic stool in transit to the hospital in the 
ambulance however heme occult is negative and subsequent BM was normal.  The 
patient's hemoglobin is stable.  GI has been consulted.  We will transfuse for 
hemoglobin less than 7. Add PPI. 

4.   Acute kidney injury.  Creatinine is now normalized.  Continue IV fluids.  
The patient does have some chronic kidney disease stage III.

5.   Severe right carotid bulb stenosis.  We will discuss further with 
Neurology.  The patient will likely need Neurosurgical evaluation and stent 
placement versus carotid endarterectomy.  Echocardiogram showed normal ejection 
fraction.  There was no wall motion abnormality. Will start on ASA and plavix 
given her symptoms of TIA. Will monitor HH closely due to episode of melena 
yesterday.



Plan:  Pending neuro eval, the patient will likely need placement to a skilled 
nursing facility versus home health with PT.  The patient does take care of her 
, who is 89 years of age and deaf, requires significant assistance.  The 
patient does have good social support in the area.  Her daughters and son lives 
close by.



ADDENDUM: Spoke with transfer center [facility] vascular surgeon. He 
recommends outpt f/up and does not recommend a transfer at this time for 
carotid stenosis. 



/ROSALIE

DD:  03/07/2019 14:00:48   Voice ID:  [id number]

DT:  03/07/2019 18:32:58   Report ID:  [id number]

ONOFRE

## 2019-03-07 NOTE — ECHO
HEIGHT: 5 ft 3 in   WEIGHT: 107 lb 12.8 oz   DATE OF STUDY: 03/07/2019   REFER DR: 
Danielle Pacheco MD

2-DIMENSIONAL: YES

     M.MODE: YES

 DOPPLER: YES

COLOR FLOW: YES



                    TDS:  NO

PORTABLE: NO

 DEFINITY:  NO

BUBBLE STUDY: NO





DIAGNOSIS:  RECURRENT FALL; HYPERTENSION



CARDIAC HISTORY:  

CATHERIZATION: NO

SURGERY: NO

PROSTHETIC VALVE: NO

PACEMAKER: YES





MEASUREMENTS (cm)

    DIASTOLIC (NORMALS)                 SYSTOLIC (NORMALS)

IVSd                 0.8 (0.6-1.2)                    LA Diam 2.9 (1.9-4.0)     LVEF       
  51%  

LVIDd               3.6 (3.5-5.7)                        LVIDs      2.7 (2.0-3.5)     %FS  
        25%

LVPWd             1.0 (0.6-1.2)

Ao Diam           2.7 (2.0-3.7)



2 DIMENSIONAL ASSESSMENT:

RIGHT ATRIUM:                   NORMAL

LEFT ATRIUM:       NORMAL



RIGHT VENTRICLE:            NORMAL

LEFT VENTRICLE: NORMAL



TRICUSPID VALVE:             NORMAL

MITRAL VALVE:    MITRAL ANNULAR CALCIFICATION



PULMONIC VALVE:             NORMAL

AORTIC VALVE:     SCLEROSIS



PERICARDIAL EFFUSION: NONE

AORTIC ROOT:      NORMAL





LEFT VENTRICULAR WALL MOTION:     NORMAL



DOPPLER/COLOR FLOW:     TRACE TRICUSPID REGURGITATION. 



COMMENTS:      NORMAL LEFT VENTRICULAR SIZE AND FUNCTION. NO WALL MOTION ABNORMALITY. 
MITRAL ANNULAR CALCIFICATION. AORTIC SCLEROSIS.



TECHNOLOGIST:   KENNETH BOBBY

## 2019-03-07 NOTE — EKG
Test Date:    2019-03-06               Test Time:    18:44:31

Technician:   SHEEBA                                    

                                                     

MEASUREMENT RESULTS:                                       

Intervals:                                           

Rate:         83                                     

OH:           170                                    

QRSD:         68                                     

QT:           382                                    

QTc:          448                                    

Axis:                                                

P:            83                                     

OH:           170                                    

QRS:          50                                     

T:            73                                     

                                                     

INTERPRETIVE STATEMENTS:                                       

                                                     

Normal sinus rhythm

Normal ECG

Compared to ECG 12/03/2002 08:54:00

No significant changes



Electronically Signed On 03-07-19 05:53:22 CST by Godfrey Small

## 2019-03-08 LAB
BLD SMEAR INTERP: (no result)
BUN BLD-MCNC: 33 MG/DL (ref 7–18)
GLUCOSE SERPLBLD-MCNC: 140 MG/DL (ref 74–106)
HCT VFR BLD CALC: 21.8 % (ref 36–45)
HCT VFR BLD CALC: 22.1 % (ref 36–45)
LYMPHOCYTES # SPEC AUTO: 0.5 K/UL (ref 0.7–4.9)
MORPHOLOGY BLD-IMP: (no result)
PMV BLD: 8.2 FL (ref 7.6–11.3)
POTASSIUM SERPL-SCNC: 4.2 MMOL/L (ref 3.5–5.1)
RBC # BLD: 2.25 M/UL (ref 3.86–4.86)

## 2019-03-08 PROCEDURE — 30233N1 TRANSFUSION OF NONAUTOLOGOUS RED BLOOD CELLS INTO PERIPHERAL VEIN, PERCUTANEOUS APPROACH: ICD-10-PCS

## 2019-03-08 PROCEDURE — 0D598ZZ DESTRUCTION OF DUODENUM, VIA NATURAL OR ARTIFICIAL OPENING ENDOSCOPIC: ICD-10-PCS

## 2019-03-08 RX ADMIN — ATORVASTATIN CALCIUM SCH MG: 40 TABLET, FILM COATED ORAL at 21:56

## 2019-03-08 RX ADMIN — SODIUM CHLORIDE SCH MLS: 0.9 INJECTION, SOLUTION INTRAVENOUS at 18:32

## 2019-03-08 RX ADMIN — Medication SCH: at 09:00

## 2019-03-08 RX ADMIN — Medication SCH ML: at 21:57

## 2019-03-08 RX ADMIN — SODIUM CHLORIDE SCH MLS: 0.9 INJECTION, SOLUTION INTRAVENOUS at 16:29

## 2019-03-08 RX ADMIN — SODIUM CHLORIDE SCH MLS: 0.9 INJECTION, SOLUTION INTRAVENOUS at 03:03

## 2019-03-08 NOTE — CON
Consultation called because of possible transient ischemic attack.



History Of Present Illness:  Ms. Fernández is an 85-year-old patient with multiple medical problems inc
luding hypertension, myocardial infarction, transient ischemic attack, hyperthyroidism, and who has a
 pacemaker, was brought in after multiple falls and episodic confusion.  The patient was at her usual
 state of health on the 6th when she became confused.  There was right-sided weakness, inability to g
et her words and thoughts out and that resulted also in multiple falls, reportedly sum of 6 falls in 
the 1 day on the 6th of March.  At the time she was evaluated at [facility], her symptoms ha
d already resolved.  Her evaluation, however, showed with CT angiogram of her neck severe stenosis of
 the right carotid bulb.  There was nonsignificant stenosis of the left side.  Her head CT scan showe
d no acute ischemic or hemorrhagic change, however, there was moderate to marked small-vessel ischemi
c disease.  Her EKG showed sinus rhythm and echocardiogram showed ejection fraction 51%, no significa
nt stenosis.  She is unable to get MRI of the brain due to having pacemaker.  The patient is admitted
 to ICU for further management.  Earlier today, she was ambulated with the help of Physical Therapy. 
 During that time, she also had a transient right arm weakness as reportedly the right arm became fla
ccid and within a few minutes the right arm returned back to a normal strength. 



The patient did have along with her other medical complaints melena, that is, upper GI blood and her 
hemoglobin and hematocrit from yesterday to today dropped from 9.1 down to 7.5.  She was evaluated by
 the gastroenterologist and found to have a bleeding duodenal ulcer.  That was treated and she is now
 receiving a unit of packed red blood cells. 



Since the return to her baseline level of functioning, she has not had an additional episode of right
-sided weakness or expressive aphasia or confusion.



Past Medical History:  As indicated above.



Past Surgical History:  Pacemaker placement, hysterectomy, and thyroidectomy.



Allergies:  PROMETHAZINE.



Family History:  Positive for cancer, colon cancer in her mother, liver cancer in father and liver ca
ncer in son.



Social History:  No alcohol, tobacco, or IV drug use.  The patient does drink caffeinated beverages.



Medications:  Norvasc 10 mg daily, Lipitor 40 mg at bedtime, Prozac 10 mg daily, Apresoline 10 mg lamberto
ry 6 hours, Cozaar 100 mg daily, Lopressor 25 mg daily, octreotide acetate 500 mcg intravenously ever
y 10 hours, Zofran 4 mg every 6 hours, pantoprazole 80 mg every 10 hours, [place] Thyroid 90 mg daily,
 Ultram 50 mg daily as needed.  She did have aspirin and Plavix earlier and both the drugs have been 
held.



Review of Systems:

Indicated she has had episodes of confusion, right-sided weakness, expressive aphasia and difficulty 
getting thoughts and words out and diffuse weakness.  Otherwise, no fevers or chills, no myalgias or 
arthralgias.  No rash.  No headache or weight change.  No psychiatric complaints.  No genitourinary c
omplaints.



Physical Examination:

Vital Signs:  Blood pressure 117/76, pulse 84, respiratory rate 16, temperature 97.2, oxygen saturati
on 99% on oxygen by nasal cannula 5 L flow rate.  Weight 107 pounds, height 5 feet 3 inches, BMI 19. 


General:  Ms. Fernández is resting comfortably in bed in ICU, receiving IV fluids and a unit of packed 
red blood cells.  She just returned from upper GI evaluation and she is just mildly sedated.  Otherwi
se, she is oriented to person, place, time, and situation.  She has expressive or receptive aphasias.
  In terms of her general examination, there is good air movement.  She has no edema or cyanosis in t
he upper or lower extremities. 

Neurological:  Cranial nerves show no deficits on 2 through 12.  She has symmetric face with full vis
ual fields to confrontation.  Pupils are round and reactive to light and accommodation.  Extraocular 
movements are intact.  Motor examination in the upper and lower extremities, she has no focal deficit
s, strength is 4+ out of 5 proximally and distally.  Sensory examination, stocking-glove loss, light 
touch, temperature in the arms and legs.  Reflexes depressed in the upper and lower extremities.  
rdination is slow, but intact in upper and lower extremities.  Gait still requires mod assist with 
e therapist.



Laboratory Studies:  Complete blood count with differential shows white blood cell count 17.7 with 93
.2% neutrophils, hemoglobin 7.5, hematocrit 21.8, and platelets 459.  INR 0.97.  Chemistries:  Sodium
 145, potassium 4.2, chloride 116, carbon dioxide 20, BUN 33, and creatinine 1.09.  Calcium is low to
 8.0.  Glucose 140.



Assessment:  Ms. Fernández is an 85-year-old patient with possible transient ischemic attacks.  The att
acks appear to affect her right arm and leg along with face and she does have also right severe carot
id bulb stenosis.  That stenosis does not easily explain the patient's clinical symptoms as it is on 
the opposite side.  In any event, she is having episodes consistent with transient ischemic attack, b
ut she has had a significant upper gastrointestinal bleed and is now off antiplatelet medication.



Plan:  

1.Once stable enough hemodynamically and there is a reduced bleeding risk, she may restart aspirin 8
1 mg enteric-coated daily.

2.Continue high-dose statin for stroke risk reduction.

3.Continue with aggressive management of hypertension for reducing stroke risk.  Otherwise, once she
 is discharged to the floor and home, she may follow up with Dr. Jeronimo in clinic in 1 month.





ALBERTO

DD:  03/08/2019 13:38:50Voice ID:  [id number]

DT:  03/08/2019 18:28:10Report ID:  [id number]

## 2019-03-08 NOTE — EEG
CHART:  [id number]

TEST ID#:  [id number]

DATE OF STUDY:  03/07/19



THE EEG WAS RECORDED PORTABLE IN THE PATIENTS ROOM ON A 17 CHANNEL MACHINE.  ELECTRODES 
WERE APPLIED IN THE USUAL MANNER USING THE INTERNATIONAL 10-20 SYSTEM.



THE WAKING BACKGROUND RHYTHM IN THIS RECORD CONSISTS OF  WELL DEVELOPED AND  WELL 
ORGANIZED WAVES OF 8.5 HZ., MAXIMAL IN THE POSTERIOR HEAD REGIONS WHICH ATTENUATE NORMALLY 
WITH EYE OPENING. LOW-VOLTAGE 18-22 HZ ACTIVITY IS EXPRESSED IN THE FRONTAL REGIONS.



THERE ARE NO FOCAL OR LATERALIZING FEATURES.  NO EPILEPTIFORM ACTIVITY APPEARS.  

SLEEP DID NOT OCCUR.  



HYPERVENTILATION WAS NOT PREFORMED.



PHOTIC STIMULATION PRODUCED POOR DRIVING BILATERALLY.





IMPRESSION:  NORMAL EEG FOR THE AGE OF THE PATIENT IN WAKE AND DROWSY STATE.

## 2019-03-08 NOTE — CON
Date of Consultation:  03/08/2019



History Of Present Illness:  This is a case of an 85-year-old patient, admitted to the hospital with 
episode of weakness.  Admitted to the hospital.  During the process found to have hematemesis.  Her u
pper endoscopy today shows a duodenal ulcer with bleeding that was cauterized by Dr. Raines.  This 
is a large ulceration in the posterior region.  He could not use the clips.  He just cauterized that 
area.  The patient does not recall any prior episode of that.  She has been on blood thinners includi
ng Plavix.  The patient currently in the ICU.



Allergies:  PHENERGAN.



Past Medical History:  MIs, TIAs, thyroid problems.



Past Surgical History:  Include hysterectomy, thyroidectomy and pacemaker.  She states some intraabdo
izaiah abscess for which she has a midline incision infraumbilical all the way down to the symphysis p
ubis. Details of that are unknown.



Social History:  She does not smoke.  She does not drink alcohol.



Family History:  Noncontributory.



Review of Systems:

Unable to be obtained.  The patient just finished an endoscopy and she want to rest.



Physical Examination:

General: The patient still awake and alert, although she wants to rest, said she has been through a l
ot she is saying, but she is calm, with no distress. 

HEENT:  Pupils anicteric. 

Chest:  Clear bilateral breath sounds. 

Abdomen:  Soft and depressible.  No guarding or rebound.  

Rectal:  Deferred. 

Extremity:  Good capillary refill.



Laboratory Data:  Blood work, hemoglobin of 7.5 with platelets of 459.  Currently she is under blood 
transfusion.  INR 0.97, chloride is 116.



Assessment:  This is an 85-year-old patient with multiple medical problems, including myocardial infa
rction, is on anticoagulation, found to have an ulcer.  I just talked to Dr. Raines.  He was able t
o cauterize the area, not to see a bleed anymore but once again clips were not done. I discussed the 
case with Dr. Raines and he agree with me.  I believe this patient is a candidate to be transferred
 to a high level of care to offer her the options of angio embolism.  We cannot provide that service 
in this institution.  It was done by the interventional radiologist.  I am contacting the primary doc
tor right now, to let him know the importance of that treatment since surgery during this conditions 
may not be the best for her.  Currently, she is on a blood transfusion.  Dr. Raines just finished t
he endoscopy.  He did not see her bleeding any more but recurrence of this are high in the place, whi
ch she can receive an angiogram embolization may be better for her.  Showing the recent data and stud
ies about how that technique may prove better than surgery in some situations.





JEZ/ROSALIE

DD:  03/08/2019 14:03:57Voice ID:  [id number]

DT:  03/08/2019 19:34:40Report ID:  [id number]

## 2019-03-08 NOTE — PN
Date of Progress Note:  03/08/2019



Subjective:  The patient is seen and examined.  Chart reviewed, and case 
discussed with RN and Dr. Raines.  The patient did have episode of 
hematemesis overnight and another episode while I was interviewing her.  
Daughter at the bedside.  The patient initially was reluctant for any scope 
when Dr. Raines had spoken to her yesterday.  However, with the daughter 
present and with continued hematemesis, the patient is now agreeable for EGD.  
The patient was declined by [facility] for transfer by vascular surgeon due to 
her severe carotid stenosis.  Dr. Nielsen recommended outpatient followup.



Medications:  List reviewed.



Code Status:  Daughter states that the patient has out of hospital DNR and her 
wishes are for do not resuscitate.  Code status has been changed.



Objective:  Vital signs:  Temperature 98.4, heart rate 92, blood pressure 188/50
, respirations 16, O2 of 99% on room air. 

General:  Awake, alert, oriented x3.  An elderly female, frail, cachectic, BMI 
19, ill-appearing. 

CV:  S1, S2.  Regular rate and rhythm.  Peripheral pulses weak bilaterally. 

Respiratory:  Moving air well bilaterally.  No wheezing. 

Gastrointestinal:  Abdomen is soft.  Tenderness to palpation in the epigastric 
region.  No rebound or guarding. 

Extremities:  No clubbing, cyanosis, or edema. 

Neuro:  Nonfocal.



Laboratory Data:  Sodium 145, potassium 4.2, chloride 116, CO2 of 20, BUN 33, 
creatinine 1.09, glucose 140, calcium 8.  WBC 12.7, H and H 7.5 and 21.8, 
platelets 459, neutrophils 93.2.



Assessment And Plan:  An 85-year-old female with:

1.   Recurrent falls, possibly due to lumbar radiculopathy.  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 
due to pacemaker.  Appreciate Neurology input.

2.   Transient ischemic attack.  CT scan is negative.  No focal neurological 
deficit.

3.   Hematemesis and melena, acute gastrointestinal bleed.  The patient will be 
going for esophagogastroduodenoscopy today.

4.   Acute blood loss anemia.  We will transfuse a unit of PRBC and monitor 
hemoglobin and hematocrit, secondary to above.

5.   Acute kidney injury.  Creatinine normalized.

6.   Severe right carotid bulb stenosis.  We will hold aspirin and Plavix due 
to gastrointestinal bleed.  The patient was declined by [facility] vascular 
surgeon, recommending outpatient followup.  No intervention at this time for 
carotid stenosis.  We will discuss further with Neurology.  We will continue 
high-dose statin.

7.   Deep venous thrombosis prophylaxis with SCDs.  No chemical anticoagulation 
due to gastrointestinal bleed. 

Transfer to ICU. Sandostatin drip, protonix drip. 





/ROSALIE

DD:  03/08/2019 11:05:12   Voice ID:  [id number]

DT:  03/08/2019 15:11:20   Report ID:  [id number]

ONOFRE

## 2019-03-08 NOTE — OP
Surgeon:  Rik Raines MD



Procedure To Be Performed:  Esophagogastroduodenoscopy.



Performing Physician:  Rik Raines M.D.



Indication For Procedure:  Hematemesis, upper GI bleed.



Plan For Anesthesia:  Monitored anesthesia care.



Complexity:  Very high.  The patient is high risk due to active GI bleed.  Other comorbidities includ
ing 90% carotid stenosis, however, this procedure has been undertaken on an emergent basis due to the
 patient's bleeding.  The patient and family both are agreeable and willing to take the risk.



Technique:  After obtaining informed consent from the patient explaining risks and complications whic
h include, but are not limited to bleeding, infection, perforation, and anesthesia complication, the 
patient was placed in a left lateral position and sedation was given.  From then on, the scope was ad
vanced through the mouth and carefully guided eventually up till the second portion of the duodenum. 
 Active bleeding was seen and treated as detailed below.  After the completion of examination, scope 
and equipment were withdrawn and procedure was terminated in a safe manner.



Findings:  Esophagus:  In the distal esophagus, a small hiatal hernia with some LA grade A esophagiti
s was seen.  Stomach:  As soon as the scope entered the stomach, there was significant amount of jed
ined material, mostly coffee-ground, with some clots that was visualized.  Significant time was spent
 with washing and suctioning.  We actually switched the scopes from the EGD to a colon scope with a m
uch wider suction channel, so that that can be aided.  After significant washing and suctioning, no g
ross gastric lesion was seen that could explain the bleeding.  Subsequently, attention was directed t
o the duodenum.  At the pylorus, there was a large clot seen, that was actually occupying the whole d
uodenal bowel.  With significant suctioning, this was able to be eventually cleared and removed.  In 
the distal bulb at an acute angle, a large cratered ulcer was seen with a large area of large visible
 vessel.  First, we went further into the second part of the duodenum.  No other lesion was seen exce
pt for just old blood.  Subsequently, the scope was withdrawn and attention was focused to this bleed
ing duodenal ulcer.  I first injected 5 cc of epinephrine around the ulcer margins, which stopped the
 bleeding and I could see the vessel in detail.  Initially, clips were attempted.  However, the ulcer
 margin was quite wide and due to the angle, clips only could be placed at the edges.  Therefore, sub
sequently, I used a gold probe and was able to cauterized the vessel with good success rate and coagu
lation.  We spent 5 minutes more just to ensure there was no recurrence of bleeding and then withdrew
 the scope.



Complications:  None.



Tolerance To Anesthesia:  Excellent.



Postoperative Diagnoses:  Hiatal hernia, esophagitis, gastritis, and large duodenal ulcer with bleedi
ng, status post treatment.



Plan:  Continue PPI and octreotide drip.  Keep n.p.o. for today.  Given the nature of the lesion and 
the risk of rebleeding, I would need surgical evaluation as a backup in case the patient rebleeds.  B
ecause in that case we would probably not be able to stop the bleeding again.  Depending on surgery's
 comfort level and the patient's comorbidities, the patient may also need to be transferred to a high
er level of care, where they may have also access to interventional radiology as an alternative.  Madelin
s was discussed with the patient and the family and Dr. Abernathy as well.  They all understand and agree
 with the plan of care.





US/MODL

DD:  03/08/2019 12:18:34Voice ID:  [id number]

DT:  03/08/2019 21:58:31Report ID:  [id number]

## 2019-03-09 LAB
BUN BLD-MCNC: 22 MG/DL (ref 7–18)
GLUCOSE SERPLBLD-MCNC: 120 MG/DL (ref 74–106)
HCT VFR BLD CALC: 20.4 % (ref 36–45)
HCT VFR BLD CALC: 28.5 % (ref 36–45)
LYMPHOCYTES # SPEC AUTO: 2.2 K/UL (ref 0.7–4.9)
PMV BLD: 8.5 FL (ref 7.6–11.3)
POTASSIUM SERPL-SCNC: 3.5 MMOL/L (ref 3.5–5.1)
RBC # BLD: 2.18 M/UL (ref 3.86–4.86)
UA DIPSTICK W REFLEX MICRO PNL UR: (no result)

## 2019-03-09 RX ADMIN — SODIUM CHLORIDE SCH MLS: 0.9 INJECTION, SOLUTION INTRAVENOUS at 14:36

## 2019-03-09 RX ADMIN — SODIUM CHLORIDE SCH MLS: 0.9 INJECTION, SOLUTION INTRAVENOUS at 02:32

## 2019-03-09 RX ADMIN — FLUOXETINE SCH MG: 10 CAPSULE ORAL at 09:46

## 2019-03-09 RX ADMIN — Medication SCH ML: at 21:50

## 2019-03-09 RX ADMIN — SODIUM CHLORIDE SCH MLS: 0.9 INJECTION, SOLUTION INTRAVENOUS at 06:22

## 2019-03-09 RX ADMIN — SODIUM CHLORIDE SCH MLS: 0.9 INJECTION, SOLUTION INTRAVENOUS at 14:35

## 2019-03-09 RX ADMIN — SODIUM CHLORIDE SCH: 0.9 INJECTION, SOLUTION INTRAVENOUS at 22:59

## 2019-03-09 RX ADMIN — Medication SCH ML: at 09:32

## 2019-03-09 RX ADMIN — ATORVASTATIN CALCIUM SCH MG: 40 TABLET, FILM COATED ORAL at 21:50

## 2019-03-09 RX ADMIN — LEVOTHYROXINE, LIOTHYRONINE SCH MG: 19; 4.5 TABLET ORAL at 06:22

## 2019-03-09 NOTE — P.PN
Subjective


Date of Service: 03/09/19


Chief Complaint: recurrent fall, possible TIA, melena





Patient seen and examined at bedside with RN.  Chart reviewed.  Case discussed 

with GI.  Currently awaiting transfer to a higher level of care for 

embolization by IR for bleeding duodenal ulcer.  Status post EGD with GI 

consistent with large duodenal ulcer that we use bleeding which was cauterized.

  No complaints to offer overnight.  States that she feels much better than 

before.  No melena or hematemesis noted





Review of Systems


10-point ROS is otherwise unremarkable





Physical Examination





- Vital Signs


Temperature: 97.3 F


Blood Pressure: 163/69


Pulse: 77


Respirations: 18


Pulse Ox (%): 99





- Physical Exam


General: Alert, In no apparent distress


HEENT: Atraumatic, PERRLA, EOMI


Neck: Supple, JVD not distended


Respiratory: Clear to auscultation bilaterally, Normal air movement


Cardiovascular: Regular rate/rhythm, Normal S1 S2


Gastrointestinal: Normal bowel sounds, No tenderness


Musculoskeletal: No tenderness


Integumentary: No rashes


Neurological: Normal speech, Normal tone, Normal affect


Lymphatics: No axilla or inguinal lymphadenopathy





- Studies


Medications List Reviewed: Yes





Assessment And Plan





- Current Problems (Diagnosis)


(1) GIB (gastrointestinal bleeding)


Current Visit: Yes   Status: Acute   


Plan: 


GI bleeding most likely secondary to duodenal ulcer


-patient status post EGD with GI


   -duodenal ulcer with cauterization.


   -on Protonix and octreotide drip at this time


   -recommendations are for patient to get embolization with IR


-H&H q.4 hr


-transfuse for hemoglobin less than 7


-will monitor closely here in the ICU


Qualifiers: 


   GI bleed type/associated pathology: gastrojejunal ulcer   Qualified Code(s): 

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   





(2) Carotid artery stenosis


Current Visit: Yes   Status: Acute   


Plan: 


Right-sided carotid artery stenosis


-We will hold aspirin and Plavix due to gastrointestinal bleed.  


-The patient was declined by [facility] vascular surgeon, recommending 

outpatient followup. 


-No intervention at this time for carotid stenosis


Qualifiers: 


   Laterality: right   Qualified Code(s): I65.21 - Occlusion and stenosis of 

right carotid artery   





(3) CKD (chronic kidney disease)


Current Visit: Yes   Status: Chronic   


Qualifiers: 


   Chronic kidney disease stage: stage 3 (moderate)   Qualified Code(s): N18.3 

- Chronic kidney disease, stage 3 (moderate)   





(4) Recurrent falls


Current Visit: Yes   Status: Acute   





(5) TIA (transient ischemic attack)


Current Visit: Yes   Status: Acute   





- Plan





Awaiting clinical improvement at this time.  Continue with IV Protonix and 

octreotide drip here in the ICU.  Monitor H&H q. 4-6 hr.  Will transfuse for 

less than 7. Patient awaiting a bed at the tertiary [facility] center for 

embolization by IR for her bleeding duodenal ulcer. 


Discharge Plan: Other


Plan to discharge in: Greater than 2 days





- Code Status/Comfort Care


Code Status Assessed: Yes


Critical Care: Yes

## 2019-03-10 LAB
BUN BLD-MCNC: 13 MG/DL (ref 7–18)
GLUCOSE SERPLBLD-MCNC: 122 MG/DL (ref 74–106)
HCT VFR BLD CALC: 28.4 % (ref 36–45)
LYMPHOCYTES # SPEC AUTO: 1.6 K/UL (ref 0.7–4.9)
PMV BLD: 8.2 FL (ref 7.6–11.3)
POTASSIUM SERPL-SCNC: 3.1 MMOL/L (ref 3.5–5.1)
RBC # BLD: 3.1 M/UL (ref 3.86–4.86)

## 2019-03-10 RX ADMIN — SODIUM CHLORIDE SCH MLS: 0.9 INJECTION, SOLUTION INTRAVENOUS at 01:50

## 2019-03-10 RX ADMIN — SODIUM CHLORIDE SCH MLS: 0.9 INJECTION, SOLUTION INTRAVENOUS at 23:10

## 2019-03-10 RX ADMIN — SODIUM CHLORIDE SCH MLS: 0.9 INJECTION, SOLUTION INTRAVENOUS at 00:20

## 2019-03-10 RX ADMIN — Medication SCH ML: at 08:42

## 2019-03-10 RX ADMIN — SODIUM CHLORIDE SCH MLS: 0.9 INJECTION, SOLUTION INTRAVENOUS at 10:34

## 2019-03-10 RX ADMIN — SODIUM CHLORIDE SCH MLS: 0.9 INJECTION, SOLUTION INTRAVENOUS at 13:30

## 2019-03-10 RX ADMIN — FLUOXETINE SCH MG: 10 CAPSULE ORAL at 08:42

## 2019-03-10 RX ADMIN — SODIUM CHLORIDE SCH MLS: 0.9 INJECTION, SOLUTION INTRAVENOUS at 01:49

## 2019-03-10 RX ADMIN — LEVOTHYROXINE, LIOTHYRONINE SCH MG: 19; 4.5 TABLET ORAL at 06:04

## 2019-03-10 RX ADMIN — HYDRALAZINE HYDROCHLORIDE PRN MG: 10 TABLET, FILM COATED ORAL at 03:37

## 2019-03-10 RX ADMIN — SODIUM CHLORIDE SCH MLS: 0.9 INJECTION, SOLUTION INTRAVENOUS at 20:38

## 2019-03-10 RX ADMIN — ATORVASTATIN CALCIUM SCH MG: 40 TABLET, FILM COATED ORAL at 23:11

## 2019-03-10 RX ADMIN — Medication SCH ML: at 23:12

## 2019-03-10 NOTE — P.PN
Subjective


Date of Service: 03/10/19


Chief Complaint: recurrent fall, possible TIA, melena





Patient seen and examined at bedside with RN.  Chart reviewed.  Case discussed 

with GI.  Currently awaiting transfer to a higher level of care for 

embolization by IR for bleeding duodenal ulcer.  Status post EGD with GI 

consistent with large duodenal ulcer that we use bleeding which was cauterized.

  No complaints to offer overnight.  States that she feels much better than 

before.  No melena or hematemesis noted





Review of Systems


10-point ROS is otherwise unremarkable





Physical Examination





- Vital Signs


Temperature: 97.6 F


Blood Pressure: 155/57


Pulse: 77


Respirations: 17


Pulse Ox (%): 98





- Physical Exam


General: Alert, In no apparent distress


HEENT: Atraumatic, PERRLA, EOMI


Neck: Supple, JVD not distended


Respiratory: Clear to auscultation bilaterally, Normal air movement


Cardiovascular: Regular rate/rhythm, Normal S1 S2


Gastrointestinal: Normal bowel sounds, No tenderness


Musculoskeletal: No tenderness


Integumentary: No rashes


Neurological: Normal speech, Normal tone, Normal affect


Lymphatics: No axilla or inguinal lymphadenopathy





- Studies


Medications List Reviewed: Yes





Assessment And Plan





- Current Problems (Diagnosis)


(1) GIB (gastrointestinal bleeding)


Current Visit: Yes   Status: Acute   


Plan: 


GI bleeding most likely secondary to duodenal ulcer


-patient status post EGD with GI


   -duodenal ulcer with cauterization.


   -on Protonix and octreotide drip at this time


   -recommendations are for patient to get embolization with IR


-H&H q.4 hr.  H&H is stable at this time


-transfuse for hemoglobin less than 7


-will monitor closely here in the ICU


Qualifiers: 


   GI bleed type/associated pathology: gastrojejunal ulcer   Qualified Code(s): 

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   





(2) Carotid artery stenosis


Current Visit: Yes   Status: Acute   


Plan: 


Right-sided carotid artery stenosis


-We will hold aspirin and Plavix due to gastrointestinal bleed.  


-The patient was declined by [facility] vascular surgeon, recommending 

outpatient followup. 


-No intervention at this time for carotid stenosis


Qualifiers: 


   Laterality: right   Qualified Code(s): I65.21 - Occlusion and stenosis of 

right carotid artery   





(3) CKD (chronic kidney disease)


Current Visit: Yes   Status: Chronic   


Qualifiers: 


   Chronic kidney disease stage: stage 3 (moderate)   Qualified Code(s): N18.3 

- Chronic kidney disease, stage 3 (moderate)   





(4) Recurrent falls


Current Visit: Yes   Status: Acute   


Plan: 


Recurrent falls at the house most likely secondary to chronic anemia worsens 

generalized weak


-fall precautions given


-PT consulted at this time








(5) TIA (transient ischemic attack)


Current Visit: Yes   Status: Acute   


Plan: 


TIA most likely secondary to chronic anemia versus generalized weakness


-head CT negative at this time


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








- Plan





Awaiting clinical improvement at this time.  Continue with IV Protonix and 

octreotide drip here in the ICU.  Monitor H&H q. 4-6 hr.  Will transfuse for 

less than 7. Patient awaiting a bed at the tertiary care center for 

embolization by IR for her bleeding duodenal ulcer. 


Discharge Plan: Other


Plan to discharge in: Greater than 2 days





- Code Status/Comfort Care


Code Status Assessed: Yes


Critical Care: Yes

## 2019-03-11 LAB
ALBUMIN SERPL BCP-MCNC: 2.6 G/DL (ref 3.4–5)
ALP SERPL-CCNC: 57 U/L (ref 45–117)
ALT SERPL W P-5'-P-CCNC: 11 U/L (ref 12–78)
AST SERPL W P-5'-P-CCNC: 18 U/L (ref 15–37)
BUN BLD-MCNC: 9 MG/DL (ref 7–18)
GLUCOSE SERPLBLD-MCNC: 163 MG/DL (ref 74–106)
HCT VFR BLD CALC: 26.1 % (ref 36–45)
LYMPHOCYTES # SPEC AUTO: 0.9 K/UL (ref 0.7–4.9)
MAGNESIUM SERPL-MCNC: 2.2 MG/DL (ref 1.8–2.4)
PMV BLD: 8.1 FL (ref 7.6–11.3)
POTASSIUM SERPL-SCNC: 3.3 MMOL/L (ref 3.5–5.1)
POTASSIUM SERPL-SCNC: 3.9 MMOL/L (ref 3.5–5.1)
RBC # BLD: 2.82 M/UL (ref 3.86–4.86)

## 2019-03-11 RX ADMIN — SODIUM CHLORIDE SCH MG: 0.9 INJECTION, SOLUTION INTRAVENOUS at 20:34

## 2019-03-11 RX ADMIN — AMLODIPINE BESYLATE SCH MG: 10 TABLET ORAL at 12:07

## 2019-03-11 RX ADMIN — SUCRALFATE SCH GM: 1 TABLET ORAL at 20:35

## 2019-03-11 RX ADMIN — HYDRALAZINE HYDROCHLORIDE PRN MG: 10 TABLET, FILM COATED ORAL at 08:19

## 2019-03-11 RX ADMIN — SODIUM CHLORIDE SCH MLS: 0.9 INJECTION, SOLUTION INTRAVENOUS at 08:19

## 2019-03-11 RX ADMIN — SODIUM CHLORIDE SCH: 0.9 INJECTION, SOLUTION INTRAVENOUS at 04:59

## 2019-03-11 RX ADMIN — METOPROLOL TARTRATE SCH MG: 25 TABLET ORAL at 12:10

## 2019-03-11 RX ADMIN — SUCRALFATE SCH GM: 1 TABLET ORAL at 18:06

## 2019-03-11 RX ADMIN — SODIUM CHLORIDE SCH MLS: 0.9 INJECTION, SOLUTION INTRAVENOUS at 05:38

## 2019-03-11 RX ADMIN — ATORVASTATIN CALCIUM SCH MG: 40 TABLET, FILM COATED ORAL at 20:35

## 2019-03-11 RX ADMIN — LEVOTHYROXINE, LIOTHYRONINE SCH MG: 19; 4.5 TABLET ORAL at 05:42

## 2019-03-11 RX ADMIN — Medication SCH: at 08:19

## 2019-03-11 RX ADMIN — SUCRALFATE SCH GM: 1 TABLET ORAL at 11:36

## 2019-03-11 RX ADMIN — FLUOXETINE SCH MG: 10 CAPSULE ORAL at 08:18

## 2019-03-11 RX ADMIN — DOCUSATE SODIUM SCH MG: 100 CAPSULE, LIQUID FILLED ORAL at 20:35

## 2019-03-11 NOTE — P.PN
Subjective


Date of Service: 03/10/19


Chief Complaint:  hematochesia, duodenal ulcer


Subjective: Improving, Doing well





Review of Systems


General: Fever (nnnnno), Chills (no), Sweats (no)


ENT: Unremarkable


Respiratory: Unremarkable


Cardiovascular: Unremarkable


Gastrointestinal: Nausea (no), Vomiting (no), Abdominal Pain (no), No Distention

, Constipation (o), Melena (no), Hematochezia (o), As per HPI


Genitourinary: Dysuria (no)





Physical Examination





- Vital Signs


Temperature: 97.6 F


Blood Pressure: 162/51


Pulse: 80


Respirations: 19


Pulse Ox (%): 99





- Physical Exam


General: Alert, In no apparent distress, Oriented x3, Oriented x1


HEENT: PERRLA, EOMI


Neck: Supple


Cardiovascular: No edema


Gastrointestinal: Soft and benign, No rebound


Musculoskeletal: No erythema, No tenderness, No warmth


Integumentary: No rashes





- Studies


h/h reviewed


Medications List Reviewed: Yes





Assessment And Plan





- Plan





Pt stillalert, comfortable, no distress, no  hematemesis





cont NPO/water





Still pending transfer to [place] 





h/h monitoring





blood transfusion as needed

## 2019-03-11 NOTE — P.PN
Subjective


Date of Service: 03/11/19


Chief Complaint:  hematochesia, duodenal ulcer





Patient seen and examined at bedside with RN.  Chart reviewed.  Case discussed 

with GI.  Currently awaiting transfer to a higher level of care for 

embolization by IR for bleeding duodenal ulcer.  Status post EGD with GI 

consistent with large duodenal ulcer that was cauterized.  No complaints to 

offer overnight.  States that she feels much better than before.  No melena or 

hematemesis noted. Hgb Stable at this time 





Review of Systems


10-point ROS is otherwise unremarkable





Physical Examination





- Vital Signs


Temperature: 97.6 F


Blood Pressure: 148/53


Pulse: 78


Respirations: 19


Pulse Ox (%): 99





- Physical Exam


General: Alert, In no apparent distress


HEENT: Atraumatic, PERRLA, EOMI


Neck: Supple, JVD not distended


Respiratory: Clear to auscultation bilaterally, Normal air movement


Cardiovascular: Regular rate/rhythm, Normal S1 S2


Gastrointestinal: Normal bowel sounds, No tenderness


Musculoskeletal: No tenderness


Integumentary: No rashes


Neurological: Normal speech, Normal tone, Normal affect


Lymphatics: No axilla or inguinal lymphadenopathy





- Studies


Medications List Reviewed: Yes





Assessment And Plan





- Current Problems (Diagnosis)


(1) GIB (gastrointestinal bleeding)


Current Visit: Yes   Status: Acute   


Plan: 


GI bleeding most likely secondary to duodenal ulcer


-patient status post EGD with GI


   -duodenal ulcer with cauterization.


   -H/H stable today. No melena or Hematemsis noted. 


   -will switch to IV protonix and add Carafate


   -recommendations are for patient to get embolization with IR when accepted 

at the tertiary center. 


-H&H q.4 hr.  H&H is stable at this time


-transfuse for hemoglobin less than 7





Qualifiers: 


   GI bleed type/associated pathology: gastrojejunal ulcer   Qualified Code(s): 

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   





(2) Carotid artery stenosis


Current Visit: Yes   Status: Acute   


Plan: 


Right-sided carotid artery stenosis


-We will hold aspirin and Plavix due to gastrointestinal bleed. 


-Per GI can resume ASA in  3/15 and plavix after 4 weeks. Will f.u with 

cardiology 


-The patient was declined by . [facility] vascular surgeon, recommending 

outpatient followup 


-No intervention at this time for carotid stenosis


Qualifiers: 


   Laterality: right   Qualified Code(s): I65.21 - Occlusion and stenosis of 

right carotid artery   





(3) CKD (chronic kidney disease)


Current Visit: Yes   Status: Chronic   


Qualifiers: 


   Chronic kidney disease stage: stage 3 (moderate)   Qualified Code(s): N18.3 

- Chronic kidney disease, stage 3 (moderate)   





(4) Recurrent falls


Current Visit: Yes   Status: Acute   


Plan: 


Recurrent falls at the house most likely secondary to chronic anemia worsens 

generalized weak


-fall precautions given


-PT consulted at this time








(5) TIA (transient ischemic attack)


Current Visit: Yes   Status: Acute   


Plan: 


TIA most likely secondary to chronic anemia versus generalized weakness


-head CT negative at this time


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








- Plan





Awaiting clinical improvement at this time.  Switched to IV protonix now.  

Monitor H&H q. 4-6 hr.  Will transfuse for less than 7. Patient awaiting a bed 

at the [facility] center for embolization by IR for her bleeding duodenal 

ulcer. Will transfer to the Floor at this time. 


Discharge Plan: Home


Plan to discharge in: Greater than 2 days





- Code Status/Comfort Care


Code Status Assessed: Yes


Critical Care: No

## 2019-03-11 NOTE — P.PN
Subjective


Date of Service: 03/11/19


Chief Complaint:  hematochesia, duodenal ulcer


Subjective: Tolerating diet, Improving





Review of Systems


10-point ROS is otherwise unremarkable


Gastrointestinal: Nausea (no), Vomiting (no), Abdominal Pain (no), No Distention

, Hematochezia (no), As per HPI





Physical Examination





- Vital Signs


Temperature: 97.6 F


Blood Pressure: 162/51


Pulse: 80


Respirations: 19


Pulse Ox (%): 99





- Physical Exam


General: Alert, In no apparent distress, Oriented x3


HEENT: PERRLA, EOMI


Neck: Supple


Cardiovascular: No edema


Gastrointestinal: Soft and benign, No rebound, No guarding


Musculoskeletal: No erythema, No tenderness, No warmth


Integumentary: No rashes





- Studies





h/h improving





oob  clears





still [place] transfer pending








Medications List Reviewed: Yes





Assessment And Plan





- Plan





alert, comfortable,  no  hematemesis





cont NPO/water





Still pending transfer to [place] 





h/h monitoring





blood transfusion as needed

## 2019-03-11 NOTE — P.PN
Subjective


Date of Service: 03/09/19


Chief Complaint:  hematochesia, duodenal ulcer


Subjective: Improving





Review of Systems


Respiratory: Shortness of Breath (no)


Cardiovascular: Chest Pain (no)


Gastrointestinal: Vomiting (no), Abdominal Pain (no), Distention





Physical Examination





- Vital Signs


Temperature: 97.6 F


Blood Pressure: 162/51


Pulse: 80


Respirations: 19


Pulse Ox (%): 99





- Physical Exam


General: Alert, In no apparent distress, Oriented x3, Cooperative


HEENT: PERRLA, EOMI


Neck: Supple


Respiratory: Normal air movement


Cardiovascular: No edema


Gastrointestinal: Soft and benign, No rebound, No guarding





- Studies


Medications List Reviewed: Yes





Assessment And Plan





- Plan





Pt alert, comfortable, no distress, no  hematemesis





cont NPO





pending transfer to [place] for possible angio embolization





h/h monitoring





blood transfusion

## 2019-03-12 LAB
BUN BLD-MCNC: 10 MG/DL (ref 7–18)
GLUCOSE SERPLBLD-MCNC: 107 MG/DL (ref 74–106)
MAGNESIUM SERPL-MCNC: 2.2 MG/DL (ref 1.8–2.4)
POTASSIUM SERPL-SCNC: 4 MMOL/L (ref 3.5–5.1)

## 2019-03-12 RX ADMIN — LEVOTHYROXINE, LIOTHYRONINE SCH MG: 19; 4.5 TABLET ORAL at 05:10

## 2019-03-12 RX ADMIN — SUCRALFATE SCH GM: 1 TABLET ORAL at 08:32

## 2019-03-12 RX ADMIN — SUCRALFATE SCH GM: 1 TABLET ORAL at 17:38

## 2019-03-12 RX ADMIN — SUCRALFATE SCH GM: 1 TABLET ORAL at 21:06

## 2019-03-12 RX ADMIN — FLUOXETINE SCH MG: 10 CAPSULE ORAL at 08:32

## 2019-03-12 RX ADMIN — SODIUM CHLORIDE SCH MG: 0.9 INJECTION, SOLUTION INTRAVENOUS at 08:32

## 2019-03-12 RX ADMIN — DOCUSATE SODIUM SCH MG: 100 CAPSULE, LIQUID FILLED ORAL at 08:32

## 2019-03-12 RX ADMIN — DOCUSATE SODIUM SCH MG: 100 CAPSULE, LIQUID FILLED ORAL at 21:06

## 2019-03-12 RX ADMIN — AMLODIPINE BESYLATE SCH MG: 10 TABLET ORAL at 08:32

## 2019-03-12 RX ADMIN — ATORVASTATIN CALCIUM SCH MG: 40 TABLET, FILM COATED ORAL at 21:06

## 2019-03-12 RX ADMIN — SUCRALFATE SCH GM: 1 TABLET ORAL at 11:21

## 2019-03-12 RX ADMIN — SODIUM CHLORIDE PRN ML: 9 INJECTION, SOLUTION INTRAMUSCULAR; INTRAVENOUS; SUBCUTANEOUS at 08:34

## 2019-03-12 RX ADMIN — SODIUM CHLORIDE SCH MG: 0.9 INJECTION, SOLUTION INTRAVENOUS at 21:06

## 2019-03-12 RX ADMIN — METOPROLOL TARTRATE SCH MG: 25 TABLET ORAL at 08:33

## 2019-03-12 NOTE — P.PN
Subjective


Date of Service: 03/12/19


Chief Complaint:  hematochesia, duodenal ulcer





Patient seen and examined at bedside with RN.  Chart reviewed.  Case discussed 

with GI.  Status post EGD with GI consistent with large duodenal ulcer that was 

cauterized.  No complaints to offer overnight.  States that she feels much 

better than before.  No melena or hematemesis noted. Hgb Stable at this time 





Review of Systems


10-point ROS is otherwise unremarkable





Physical Examination





- Vital Signs


Temperature: 98.4 F


Blood Pressure: 149/63


Pulse: 67


Respirations: 18


Pulse Ox (%): 98





- Physical Exam


General: Alert, In no apparent distress


HEENT: Atraumatic, PERRLA, EOMI


Neck: Supple, JVD not distended


Respiratory: Clear to auscultation bilaterally, Normal air movement


Cardiovascular: Regular rate/rhythm, Normal S1 S2


Gastrointestinal: Normal bowel sounds, No tenderness


Musculoskeletal: No tenderness


Integumentary: No rashes


Neurological: Normal speech, Normal tone, Normal affect


Lymphatics: No axilla or inguinal lymphadenopathy





- Studies


Medications List Reviewed: Yes





Assessment And Plan





- Current Problems (Diagnosis)


(1) GIB (gastrointestinal bleeding)


Current Visit: Yes   Status: Acute   


Plan: 


GI bleeding most likely secondary to duodenal ulcer


-patient status post EGD with GI


   -duodenal ulcer with cauterization.


   -H/H stable today. No melena or Hematemsis noted. 


   -will switch to IV protonix and add Carafate


   -recommendations are for patient to get embolization with IR if H/H drops or 

pt becomes unstable. 


-H&H q.4 hr.  H&H is stable at this time


-transfuse for hemoglobin less than 7





Qualifiers: 


   GI bleed type/associated pathology: gastrojejunal ulcer   Qualified Code(s): 

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   





(2) Carotid artery stenosis


Current Visit: Yes   Status: Acute   


Plan: 


Right-sided carotid artery stenosis


-We will hold aspirin and Plavix due to gastrointestinal bleed. 


-Per GI can resume ASA in  3/15 and plavix after 4 weeks. Will f.u with 

cardiology 


-The patient was declined by [facility]'s vascular surgeon, recommending 

outpatient followup 


-No intervention at this time for carotid stenosis


Qualifiers: 


   Laterality: right   Qualified Code(s): I65.21 - Occlusion and stenosis of 

right carotid artery   





(3) CKD (chronic kidney disease)


Current Visit: Yes   Status: Chronic   


Qualifiers: 


   Chronic kidney disease stage: stage 3 (moderate)   Qualified Code(s): N18.3 

- Chronic kidney disease, stage 3 (moderate)   





(4) Recurrent falls


Current Visit: Yes   Status: Acute   


Plan: 


Recurrent falls at the house most likely secondary to chronic anemia worsens 

generalized weak


-fall precautions given


-PT consulted at this time








(5) TIA (transient ischemic attack)


Current Visit: Yes   Status: Acute   


Plan: 


TIA most likely secondary to chronic anemia versus generalized weakness


-head CT negative at this time


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








- Plan





Awaiting clinical improvement at this time.  Switched to IV protonix now.  

Monitor H&H q. 4-6 hr.  Will transfuse for less than 7. 


Discharge Plan: Home


Plan to discharge in: Greater than 2 days





- Code Status/Comfort Care


Code Status Assessed: Yes


Critical Care: No

## 2019-03-13 LAB
ALBUMIN SERPL BCP-MCNC: 2.6 G/DL (ref 3.4–5)
ALP SERPL-CCNC: 64 U/L (ref 45–117)
ALT SERPL W P-5'-P-CCNC: 11 U/L (ref 12–78)
AST SERPL W P-5'-P-CCNC: 17 U/L (ref 15–37)
BUN BLD-MCNC: 11 MG/DL (ref 7–18)
GLUCOSE SERPLBLD-MCNC: 102 MG/DL (ref 74–106)
HCT VFR BLD CALC: 26.8 % (ref 36–45)
LYMPHOCYTES # SPEC AUTO: 1.4 K/UL (ref 0.7–4.9)
PMV BLD: 8.4 FL (ref 7.6–11.3)
POTASSIUM SERPL-SCNC: 4.2 MMOL/L (ref 3.5–5.1)
RBC # BLD: 2.84 M/UL (ref 3.86–4.86)

## 2019-03-13 RX ADMIN — SODIUM CHLORIDE SCH MG: 0.9 INJECTION, SOLUTION INTRAVENOUS at 08:30

## 2019-03-13 RX ADMIN — SUCRALFATE SCH GM: 1 TABLET ORAL at 20:58

## 2019-03-13 RX ADMIN — SODIUM CHLORIDE PRN ML: 9 INJECTION, SOLUTION INTRAMUSCULAR; INTRAVENOUS; SUBCUTANEOUS at 08:30

## 2019-03-13 RX ADMIN — SODIUM CHLORIDE SCH MG: 0.9 INJECTION, SOLUTION INTRAVENOUS at 20:57

## 2019-03-13 RX ADMIN — DOCUSATE SODIUM SCH MG: 100 CAPSULE, LIQUID FILLED ORAL at 08:30

## 2019-03-13 RX ADMIN — SUCRALFATE SCH GM: 1 TABLET ORAL at 11:35

## 2019-03-13 RX ADMIN — AMLODIPINE BESYLATE SCH MG: 10 TABLET ORAL at 08:29

## 2019-03-13 RX ADMIN — FLUOXETINE SCH MG: 10 CAPSULE ORAL at 10:14

## 2019-03-13 RX ADMIN — DOCUSATE SODIUM SCH MG: 100 CAPSULE, LIQUID FILLED ORAL at 20:58

## 2019-03-13 RX ADMIN — SUCRALFATE SCH GM: 1 TABLET ORAL at 08:29

## 2019-03-13 RX ADMIN — SUCRALFATE SCH GM: 1 TABLET ORAL at 16:19

## 2019-03-13 RX ADMIN — ATORVASTATIN CALCIUM SCH MG: 40 TABLET, FILM COATED ORAL at 20:58

## 2019-03-13 RX ADMIN — METOPROLOL TARTRATE SCH MG: 25 TABLET ORAL at 08:28

## 2019-03-13 RX ADMIN — LEVOTHYROXINE, LIOTHYRONINE SCH MG: 19; 4.5 TABLET ORAL at 05:30

## 2019-03-13 NOTE — P.PN
Subjective


Date of Service: 03/13/19


Chief Complaint:  hematochesia, duodenal ulcer





Patient seen and examined at bedside with RN.  Chart reviewed.  Case discussed 

with GI.  Status post EGD with GI consistent with large duodenal ulcer that was 

cauterized.  No complaints to offer overnight.  States that she feels much 

better than before.  No melena or hematemesis noted. Hgb Stable at this time 





Review of Systems


10-point ROS is otherwise unremarkable





Physical Examination





- Vital Signs


Temperature: 97.9 F


Blood Pressure: 144/68


Pulse: 62


Respirations: 16


Pulse Ox (%): 98





- Physical Exam


General: Alert, In no apparent distress


HEENT: Atraumatic, PERRLA, EOMI


Neck: Supple, JVD not distended


Respiratory: Clear to auscultation bilaterally, Normal air movement


Cardiovascular: Regular rate/rhythm, Normal S1 S2


Gastrointestinal: Normal bowel sounds, No tenderness


Musculoskeletal: No tenderness


Integumentary: No rashes


Neurological: Normal speech, Normal tone, Normal affect


Lymphatics: No axilla or inguinal lymphadenopathy





- Studies


Medications List Reviewed: Yes





Assessment And Plan





- Current Problems (Diagnosis)


(1) GIB (gastrointestinal bleeding)


Current Visit: Yes   Status: Acute   


Plan: 


GI bleeding most likely secondary to duodenal ulcer


-patient status post EGD with GI


   -duodenal ulcer with cauterization.


   -H/H stable today. No melena or Hematemsis noted. 


   -Now on IV protonix and add Carafate


   -recommendations are for patient to get embolization with IR if H/H drops or 

pt becomes unstable. 


-H&H q.4 hr.  H&H is stable at this time


-transfuse for hemoglobin less than 7





Qualifiers: 


   GI bleed type/associated pathology: gastrojejunal ulcer   Qualified Code(s): 

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   





(2) Carotid artery stenosis


Current Visit: Yes   Status: Acute   


Plan: 


Right-sided carotid artery stenosis


-We will hold aspirin and Plavix due to gastrointestinal bleed. 


-Per GI can resume ASA on  3/15 and plavix after 4 weeks. Will f.u with 

cardiology 


-The patient was declined by [facility] vascular surgeon, recommending 

outpatient followup 


-No intervention at this time for carotid stenosis


Qualifiers: 


   Laterality: right   Qualified Code(s): I65.21 - Occlusion and stenosis of 

right carotid artery   





(3) CKD (chronic kidney disease)


Current Visit: Yes   Status: Chronic   


Qualifiers: 


   Chronic kidney disease stage: stage 3 (moderate)   Qualified Code(s): N18.3 

- Chronic kidney disease, stage 3 (moderate)   





(4) Recurrent falls


Current Visit: Yes   Status: Acute   


Plan: 


Recurrent falls at the house most likely secondary to chronic anemia worsens 

generalized weak


-fall precautions given


-PT consulted at this time








(5) TIA (transient ischemic attack)


Current Visit: Yes   Status: Acute   


Plan: 


TIA most likely secondary to chronic anemia versus generalized weakness


-head CT negative at this time


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








- Plan





Awaiting clinical improvement at this time.  Switched to IV protonix now.  

Monitor H&H q. 4-6 hr.  Will transfuse for less than 7. 


Discharge Plan: Home


Plan to discharge in: 48 Hours





- Code Status/Comfort Care


Code Status Assessed: Yes


Critical Care: No

## 2019-03-14 LAB
ALBUMIN SERPL BCP-MCNC: 2.7 G/DL (ref 3.4–5)
ALP SERPL-CCNC: 57 U/L (ref 45–117)
ALT SERPL W P-5'-P-CCNC: 9 U/L (ref 12–78)
AST SERPL W P-5'-P-CCNC: 13 U/L (ref 15–37)
BUN BLD-MCNC: 13 MG/DL (ref 7–18)
GLUCOSE SERPLBLD-MCNC: 98 MG/DL (ref 74–106)
HCT VFR BLD CALC: 25.9 % (ref 36–45)
LYMPHOCYTES # SPEC AUTO: 1.4 K/UL (ref 0.7–4.9)
PMV BLD: 8.4 FL (ref 7.6–11.3)
POTASSIUM SERPL-SCNC: 3.5 MMOL/L (ref 3.5–5.1)
RBC # BLD: 2.81 M/UL (ref 3.86–4.86)

## 2019-03-14 RX ADMIN — SODIUM CHLORIDE SCH MG: 0.9 INJECTION, SOLUTION INTRAVENOUS at 20:53

## 2019-03-14 RX ADMIN — SUCRALFATE SCH GM: 1 TABLET ORAL at 11:51

## 2019-03-14 RX ADMIN — DOCUSATE SODIUM SCH MG: 100 CAPSULE, LIQUID FILLED ORAL at 20:53

## 2019-03-14 RX ADMIN — DOCUSATE SODIUM SCH MG: 100 CAPSULE, LIQUID FILLED ORAL at 08:58

## 2019-03-14 RX ADMIN — LEVOTHYROXINE, LIOTHYRONINE SCH MG: 19; 4.5 TABLET ORAL at 06:10

## 2019-03-14 RX ADMIN — SUCRALFATE SCH GM: 1 TABLET ORAL at 08:58

## 2019-03-14 RX ADMIN — AMLODIPINE BESYLATE SCH MG: 10 TABLET ORAL at 08:58

## 2019-03-14 RX ADMIN — SUCRALFATE SCH GM: 1 TABLET ORAL at 16:33

## 2019-03-14 RX ADMIN — ATORVASTATIN CALCIUM SCH MG: 40 TABLET, FILM COATED ORAL at 20:53

## 2019-03-14 RX ADMIN — METOPROLOL TARTRATE SCH MG: 25 TABLET ORAL at 08:59

## 2019-03-14 RX ADMIN — SUCRALFATE SCH GM: 1 TABLET ORAL at 20:53

## 2019-03-14 RX ADMIN — FLUOXETINE SCH MG: 10 CAPSULE ORAL at 09:00

## 2019-03-14 RX ADMIN — SODIUM CHLORIDE SCH MG: 0.9 INJECTION, SOLUTION INTRAVENOUS at 08:58

## 2019-03-14 NOTE — P.PN
Subjective


Date of Service: 03/14/19


Chief Complaint:  hematochesia, duodenal ulcer





Patient seen and examined at bedside with RN.  Chart reviewed.  Case discussed 

with GI.  Status post EGD with GI consistent with large duodenal ulcer that was 

cauterized.  No complaints to offer overnight.  States that she feels much 

better than before.  No melena or hematemesis noted. Hgb Stable at this time. 





Review of Systems


10-point ROS is otherwise unremarkable





Physical Examination





- Vital Signs


Temperature: 98.3 F


Blood Pressure: 108/46


Pulse: 61


Respirations: 16


Pulse Ox (%): 100





- Physical Exam


General: Alert, In no apparent distress


HEENT: Atraumatic, PERRLA, EOMI


Neck: Supple, JVD not distended


Respiratory: Clear to auscultation bilaterally, Normal air movement


Cardiovascular: Regular rate/rhythm, Normal S1 S2


Gastrointestinal: Normal bowel sounds, No tenderness


Musculoskeletal: No tenderness


Integumentary: No rashes


Neurological: Normal speech, Normal tone, Normal affect


Lymphatics: No axilla or inguinal lymphadenopathy





- Studies


Medications List Reviewed: Yes





Assessment And Plan





- Current Problems (Diagnosis)


(1) GIB (gastrointestinal bleeding)


Current Visit: Yes   Status: Acute   


Plan: 


GI bleeding most likely secondary to duodenal ulcer


-patient status post EGD with GI


   -duodenal ulcer with cauterization.


   -H/H stable today. No melena or Hematemsis noted. 


   -Now on IV protonix and add Carafate


-General Surgery consulted. Appreciated Reccs


   -Per Surgery reccs Advanced Diet to FLD today and GI soft on saturday. 


   -pt can be restarted back on ASA and monitor for next 48hrs.


   -If hgb steady patient can then be discharged home after a RBC scan. If hgb 

drop may consider transfer to tertiary care for Embolization. 


-H&H daily.  H&H is stable at this time


-transfuse for hemoglobin less than 7





Qualifiers: 


   GI bleed type/associated pathology: gastrojejunal ulcer   Qualified Code(s): 

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   





(2) Carotid artery stenosis


Current Visit: Yes   Status: Acute   


Plan: 


Right-sided carotid artery stenosis


-We will hold aspirin and Plavix due to gastrointestinal bleed. 


-Per GI can resume ASA on  3/15/19 and plavix after 4 weeks. Will f.u with 

cardiology 


-The patient was declined by [facility] vascular surgeon, recommending 

outpatient followup 


-No intervention at this time for carotid stenosis


Qualifiers: 


   Laterality: right   Qualified Code(s): I65.21 - Occlusion and stenosis of 

right carotid artery   





(3) CKD (chronic kidney disease)


Current Visit: Yes   Status: Chronic   


Qualifiers: 


   Chronic kidney disease stage: stage 3 (moderate)   Qualified Code(s): N18.3 

- Chronic kidney disease, stage 3 (moderate)   





(4) Recurrent falls


Current Visit: Yes   Status: Acute   


Plan: 


Recurrent falls at the house most likely secondary to chronic anemia worsens 

generalized weak


-fall precautions given


-PT consulted at this time








(5) TIA (transient ischemic attack)


Current Visit: Yes   Status: Acute   


Plan: 


TIA most likely secondary to chronic anemia versus generalized weakness


-head CT negative at this time


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








- Plan





Awaiting clinical improvement at this time.  Switched to IV protonix now.  Per 

Surgery reccs Advanced Diet to FLD today and GI soft on saturday. pt can be 

restarted back on ASA and monitor for next 48hrs. If hgb steady patient can 

then be discharged home after a RBC scan. If hgb drop may consider transfer to 

tertiary care for Embolization.

## 2019-03-15 LAB
ALBUMIN SERPL BCP-MCNC: 2.6 G/DL (ref 3.4–5)
ALP SERPL-CCNC: 62 U/L (ref 45–117)
ALT SERPL W P-5'-P-CCNC: 11 U/L (ref 12–78)
AST SERPL W P-5'-P-CCNC: 12 U/L (ref 15–37)
BUN BLD-MCNC: 13 MG/DL (ref 7–18)
GLUCOSE SERPLBLD-MCNC: 108 MG/DL (ref 74–106)
HCT VFR BLD CALC: 25.8 % (ref 36–45)
LYMPHOCYTES # SPEC AUTO: 1.3 K/UL (ref 0.7–4.9)
PMV BLD: 8.1 FL (ref 7.6–11.3)
POTASSIUM SERPL-SCNC: 4.1 MMOL/L (ref 3.5–5.1)
RBC # BLD: 2.78 M/UL (ref 3.86–4.86)

## 2019-03-15 RX ADMIN — FLUOXETINE SCH MG: 10 CAPSULE ORAL at 08:26

## 2019-03-15 RX ADMIN — SODIUM CHLORIDE SCH MG: 0.9 INJECTION, SOLUTION INTRAVENOUS at 21:52

## 2019-03-15 RX ADMIN — LEVOTHYROXINE, LIOTHYRONINE SCH MG: 19; 4.5 TABLET ORAL at 05:00

## 2019-03-15 RX ADMIN — METOPROLOL TARTRATE SCH MG: 25 TABLET ORAL at 08:26

## 2019-03-15 RX ADMIN — AMLODIPINE BESYLATE SCH MG: 10 TABLET ORAL at 08:25

## 2019-03-15 RX ADMIN — ATORVASTATIN CALCIUM SCH MG: 40 TABLET, FILM COATED ORAL at 21:52

## 2019-03-15 RX ADMIN — SUCRALFATE SCH GM: 1 TABLET ORAL at 16:35

## 2019-03-15 RX ADMIN — DOCUSATE SODIUM SCH MG: 100 CAPSULE, LIQUID FILLED ORAL at 21:52

## 2019-03-15 RX ADMIN — SUCRALFATE SCH GM: 1 TABLET ORAL at 12:25

## 2019-03-15 RX ADMIN — DOCUSATE SODIUM SCH MG: 100 CAPSULE, LIQUID FILLED ORAL at 08:25

## 2019-03-15 RX ADMIN — SUCRALFATE SCH GM: 1 TABLET ORAL at 21:52

## 2019-03-15 RX ADMIN — SODIUM CHLORIDE SCH MG: 0.9 INJECTION, SOLUTION INTRAVENOUS at 08:26

## 2019-03-15 RX ADMIN — SUCRALFATE SCH GM: 1 TABLET ORAL at 08:25

## 2019-03-15 NOTE — P.PN
Subjective


Date of Service: 03/15/19


Chief Complaint:  hematochesia, duodenal ulcer


Subjective: No C/O voiced, Tolerating diet, Ambulating, Improving





Patient doing well this morning. Has tolerated fluids without n/v/d. Stated 

that she still has mild black tar like stool. Otherwise no other new complaint. 

Will advance diet tomorrow and continue to monitor H/H. 





<AubriemichaelireneTon - Last Filed: 03/15/19 13:57>


Date of Service: 03/15/19





<Frank Lu - Last Filed: 03/15/19 15:03>





Review of Systems


General: Unremarkable


Eyes: Unremarkable


ENT: Unremarkable


Respiratory: Unremarkable


Cardiovascular: Unremarkable


Gastrointestinal: Unremarkable


Genitourinary: Unremarkable


Musculoskeletal: Unremarkable


Integumentary: Unremarkable


Neurological: Unremarkable





<AubrieankurTon - Last Filed: 03/15/19 13:57>





Physical Examination





- Vital Signs


Temperature: 98.4 F


Blood Pressure: 130/61


Pulse: 64


Respirations: 16


Pulse Ox (%): 97





- Physical Exam


General: Alert, In no apparent distress, Oriented x3, Cooperative


HEENT: Normocephalic, PERRLA, Mucous membr. moist/pink


Neck: Supple, No Thyromegaly


Respiratory: Clear to auscultation bilaterally, Normal air movement


Cardiovascular: No edema, Normal pulses, Regular rate/rhythm, Normal S1 S2, 

Abnormal S3, No gallops, No rubs, No murmurs


Capillary refill: <2 Seconds


Gastrointestinal: Normal bowel sounds, Soft and benign, Non-distended, No 

tenderness, No masses, No rebound, No guarding


Musculoskeletal: No clubbing, No swelling, No contractures, No erythema, No 

tenderness, No warmth


Integumentary: No rashes, No breakdown, No significant lesion, No tenderness/

swelling, No erythema, No warmth, No cyanosis


Neurological: Normal speech, Normal strength at 5/5 x4 extr, Normal tone, 

Sensation intact, Cranial nerves 3-12 intact, Normal reflexes 2+, Normal affect





- Studies


Medications List Reviewed: Yes





<AubriemichaelSoumya bonillaTon - Last Filed: 03/15/19 13:57>





Assessment & Plan





- Problems (Diagnosis)


(1) Anemia


Current Visit: Yes   Status: Acute   


Plan: 


Monitoring H/H


Qualifiers: 


   Anemia type: unspecified type   Qualified Code(s): D64.9 - Anemia, 

unspecified   





(2) GIB (gastrointestinal bleeding)


Current Visit: Yes   Status: Acute   


Qualifiers: 


   GI bleed type/associated pathology: gastrojejunal ulcer   Qualified Code(s): 

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   





(3) CKD (chronic kidney disease)


Current Visit: Yes   Status: Chronic   


Qualifiers: 


   Chronic kidney disease stage: stage 3 (moderate)   Qualified Code(s): N18.3 

- Chronic kidney disease, stage 3 (moderate)   





<Ton Johnson - Last Filed: 03/15/19 13:57>


Physician Review: Patient Assessed, Agree with Above Assessment and Plan


Physician Review Additional Text: 





Patient seen and evaluate with VIVIAN Ward. Patient is stable. H/H is also 

stable. No signs of bleeding. Will restart ASA as recommended by GI. If no 

bleeding then will advance diet to GI soft tomorrow. If stable after that then 

can consider discharge. Plavix can be restarted in 4 weeks. This is to be 

further addressed and monitored closely. 





Dr. Abernathy to take over care tomorrow. 


Time Spent Managing Pts Care (In Minutes): 55





<Frank Lu - Last Filed: 03/15/19 15:03>

## 2019-03-16 LAB
BUN BLD-MCNC: 14 MG/DL (ref 7–18)
GLUCOSE SERPLBLD-MCNC: 90 MG/DL (ref 74–106)
HCT VFR BLD CALC: 25.4 % (ref 36–45)
LYMPHOCYTES # SPEC AUTO: 1.8 K/UL (ref 0.7–4.9)
MAGNESIUM SERPL-MCNC: 1.9 MG/DL (ref 1.8–2.4)
PMV BLD: 8.1 FL (ref 7.6–11.3)
POTASSIUM SERPL-SCNC: 4.3 MMOL/L (ref 3.5–5.1)
RBC # BLD: 2.71 M/UL (ref 3.86–4.86)

## 2019-03-16 RX ADMIN — ASPIRIN SCH MG: 81 TABLET, COATED ORAL at 09:51

## 2019-03-16 RX ADMIN — SODIUM CHLORIDE SCH MG: 0.9 INJECTION, SOLUTION INTRAVENOUS at 22:57

## 2019-03-16 RX ADMIN — DOCUSATE SODIUM SCH MG: 100 CAPSULE, LIQUID FILLED ORAL at 22:57

## 2019-03-16 RX ADMIN — LEVOTHYROXINE, LIOTHYRONINE SCH MG: 19; 4.5 TABLET ORAL at 05:32

## 2019-03-16 RX ADMIN — FLUOXETINE SCH MG: 10 CAPSULE ORAL at 09:49

## 2019-03-16 RX ADMIN — AMLODIPINE BESYLATE SCH MG: 10 TABLET ORAL at 09:50

## 2019-03-16 RX ADMIN — SUCRALFATE SCH GM: 1 TABLET ORAL at 22:56

## 2019-03-16 RX ADMIN — ATORVASTATIN CALCIUM SCH MG: 40 TABLET, FILM COATED ORAL at 22:55

## 2019-03-16 RX ADMIN — SODIUM CHLORIDE SCH MG: 0.9 INJECTION, SOLUTION INTRAVENOUS at 09:51

## 2019-03-16 RX ADMIN — METOPROLOL TARTRATE SCH MG: 25 TABLET ORAL at 09:50

## 2019-03-16 RX ADMIN — SUCRALFATE SCH GM: 1 TABLET ORAL at 12:09

## 2019-03-16 RX ADMIN — DOCUSATE SODIUM SCH MG: 100 CAPSULE, LIQUID FILLED ORAL at 09:50

## 2019-03-16 RX ADMIN — SUCRALFATE SCH GM: 1 TABLET ORAL at 17:20

## 2019-03-16 RX ADMIN — SUCRALFATE SCH GM: 1 TABLET ORAL at 08:27

## 2019-03-16 NOTE — PN
Date of Progress Note:  03/16/2019



History:  The patient seen and examined.  Chart reviewed and case discussed with RN.  The patient den
ies any further GI bleed.  Scheduled for PillCam, Monday.



Code Status:  Full.



Medications:  List reviewed.



Physical Examination:

Vital Signs:  Temperature 97, heart rate 63, blood pressure 110/70, respirations 18, O2 99% on room a
ir. 

General:  Awake, alert, oriented x3.  Elderly female, not in any acute distress.  Appears fatigued, f
rail.  BMI 18. 

CV:  S1, S2.  Peripheral pulses present.  No murmurs. 

Respiratory:  Moving air well bilaterally.  No wheezing. 

Gastrointestinal:  Abdomen is soft, nontender, nondistended.  Positive bowel sounds. 

Extremities:  No clubbing, cyanosis, or edema. 

Neurologic:  Nonfocal.  Cranial nerves 2-12 intact grossly.  Speech is normal.



Laboratory Data:  Sodium 140, potassium 4.3, chloride 106, CO2 28, BUN 14, creatinine 1.23, glucose 9
0, magnesium 1.9, calcium 8.2.  WBC 7.1, H and H 8.7 and 22.2, platelets 146, neutrophils 57%.



Assessment And Plan:  An 85-year-old female with:

1.Acute blood loss anemia.  We will continue to monitor hemoglobin and hematocrit and transfuse as n
eeded secondary to gastrointestinal bleed.

2.Acute gastrointestinal bleed secondary to gastrojejunal ulcer, status post esophagogastroduodenosc
opy and cauterization.  Unable to have clipping done.  The patient was attempted to be transferred fo
r embolization, however, was unable to be transferred due to multiple reasons.

3.Chronic kidney disease stage 3.  We will continue to monitor creatinine, currently normalized.  We
 will avoid NSAIDs.

4.Deep venous thrombosis prophylaxis with SCDs.

5.Aspirin has been restarted.  No further signs of bleeding.  Plavix will be restarted in 4 weeks.

6.The patient has severe carotid disease, and we will need outpatient vascular or cardiothoracic frank
luation.  I did speak with [facility]'s vascular surgeon, Dr. Nielsen, who recommended outpatient followu
p.  He declined inpatient transfer.

7.Recurrent falls, continue PT.

8.Transient ischemic attack.





SA/MODL

DD:  03/16/2019 16:44:52Voice ID:  [id number]

DT:  03/16/2019 19:04:32Report ID:  [id number]

## 2019-03-17 LAB
BUN BLD-MCNC: 12 MG/DL (ref 7–18)
GLUCOSE SERPLBLD-MCNC: 93 MG/DL (ref 74–106)
HCT VFR BLD CALC: 25.6 % (ref 36–45)
LYMPHOCYTES # SPEC AUTO: 2.2 K/UL (ref 0.7–4.9)
MAGNESIUM SERPL-MCNC: 1.9 MG/DL (ref 1.8–2.4)
PMV BLD: 8.5 FL (ref 7.6–11.3)
POTASSIUM SERPL-SCNC: 3.8 MMOL/L (ref 3.5–5.1)
RBC # BLD: 2.72 M/UL (ref 3.86–4.86)

## 2019-03-17 RX ADMIN — SODIUM CHLORIDE SCH MG: 0.9 INJECTION, SOLUTION INTRAVENOUS at 21:49

## 2019-03-17 RX ADMIN — ASPIRIN SCH MG: 81 TABLET, COATED ORAL at 08:25

## 2019-03-17 RX ADMIN — AMLODIPINE BESYLATE SCH MG: 10 TABLET ORAL at 08:25

## 2019-03-17 RX ADMIN — SUCRALFATE SCH GM: 1 TABLET ORAL at 08:23

## 2019-03-17 RX ADMIN — SUCRALFATE SCH GM: 1 TABLET ORAL at 11:32

## 2019-03-17 RX ADMIN — SODIUM CHLORIDE PRN ML: 9 INJECTION, SOLUTION INTRAMUSCULAR; INTRAVENOUS; SUBCUTANEOUS at 08:26

## 2019-03-17 RX ADMIN — ATORVASTATIN CALCIUM SCH MG: 40 TABLET, FILM COATED ORAL at 21:49

## 2019-03-17 RX ADMIN — METOPROLOL TARTRATE SCH MG: 25 TABLET ORAL at 08:24

## 2019-03-17 RX ADMIN — SUCRALFATE SCH GM: 1 TABLET ORAL at 21:49

## 2019-03-17 RX ADMIN — SODIUM CHLORIDE SCH MG: 0.9 INJECTION, SOLUTION INTRAVENOUS at 08:24

## 2019-03-17 RX ADMIN — FLUOXETINE SCH MG: 10 CAPSULE ORAL at 08:25

## 2019-03-17 RX ADMIN — DOCUSATE SODIUM SCH MG: 100 CAPSULE, LIQUID FILLED ORAL at 21:49

## 2019-03-17 RX ADMIN — LEVOTHYROXINE, LIOTHYRONINE SCH MG: 19; 4.5 TABLET ORAL at 05:11

## 2019-03-17 RX ADMIN — DOCUSATE SODIUM SCH MG: 100 CAPSULE, LIQUID FILLED ORAL at 08:24

## 2019-03-17 RX ADMIN — SUCRALFATE SCH GM: 1 TABLET ORAL at 16:51

## 2019-03-17 NOTE — PN
Subjective:  The patient is seen and examined, chart reviewed and case 
discussed with RN.  The patient did well overnight, able to get more sleep than 
yesterday.  No significant complaints.  No further blood in the stool.



Medications:  List reviewed.



Physical Examination:

Vital Signs:  Temperature 98.3, heart rate 62, blood pressure 113/46, 
respirations 18, O2 99% on room air. 

General:  Awake, alert, oriented x3, elderly female, frail, cachectic.  BMI 18. 

CV:  S1, S2.  Peripheral pulses weak bilaterally. 

Respiratory:  Moving air well bilaterally.  No wheezing. 

Gastrointestinal:  Abdomen is soft, nontender, nondistended.  Positive bowel 
sounds. 

Extremities:  No clubbing, cyanosis, or edema. 

Neuro:  Nonfocal.



Laboratory Data:  Sodium 139, potassium 3.8, chloride 105, CO2 of 27, BUN 12, 
creatinine 1.29, glucose 93, calcium 8, magnesium 1.9.  WBC 7.5, H and H 8.7 
and 25.6, platelets 526.



Assessment And Plan:  An 85-year-old female with:

1.   Acute blood loss anemia.  H and H are stable secondary to gastrointestinal 
bleed.  We will monitor and transfuse for hemoglobin less than 7.

2.   Acute gastrointestinal bleed secondary to gastrojejunal ulcer, status post 
esophagogastroduodenoscopy and cauterization.  The patient was unable to be 
transferred for embolization.  We will continue to monitor closely and continue 
PPI.  The patient will need repeat EGD in 4-6 weeks.

3.   Chronic kidney disease, stage 3.  Creatinine is normalized.  Avoid NSAIDs.
  Monitor creatinine level.

4.   Recurrent falls.  Continue PT.

5.   Transient ischemic attack.

6.   Severe carotid artery stenosis.  Aspirin has been safely resumed.  H and H 
have not made any further drops.  Plavix currently still on hold.  The patient 
will need to wait approximately 4 weeks prior to restarting Plavix.  The 
patient was on Plavix before admission.  The patient will need to follow up 
with Dr. Gross at [facility] for further workup of her carotid stenosis.

7.   Deep vein thrombosis prophylaxis with sequential compression devices.



Plan:  Will benefit from PillCam study.  If workup is negative, may be 
discharged home.





/ROSALIE

DD:  03/17/2019 14:05:10   Voice ID:  [id number]

DT:  03/17/2019 17:49:57   Report ID:  [id number]

ONOFRE

## 2019-03-18 VITALS — DIASTOLIC BLOOD PRESSURE: 56 MMHG | TEMPERATURE: 97.9 F | SYSTOLIC BLOOD PRESSURE: 133 MMHG

## 2019-03-18 VITALS — OXYGEN SATURATION: 96 %

## 2019-03-18 LAB
BUN BLD-MCNC: 10 MG/DL (ref 7–18)
GLUCOSE SERPLBLD-MCNC: 99 MG/DL (ref 74–106)
HCT VFR BLD CALC: 25.8 % (ref 36–45)
LYMPHOCYTES # SPEC AUTO: 1.7 K/UL (ref 0.7–4.9)
MAGNESIUM SERPL-MCNC: 2 MG/DL (ref 1.8–2.4)
PMV BLD: 8.2 FL (ref 7.6–11.3)
POTASSIUM SERPL-SCNC: 4.4 MMOL/L (ref 3.5–5.1)
RBC # BLD: 2.74 M/UL (ref 3.86–4.86)

## 2019-03-18 RX ADMIN — AMLODIPINE BESYLATE SCH MG: 10 TABLET ORAL at 08:12

## 2019-03-18 RX ADMIN — ASPIRIN SCH MG: 81 TABLET, COATED ORAL at 08:13

## 2019-03-18 RX ADMIN — SUCRALFATE SCH GM: 1 TABLET ORAL at 16:26

## 2019-03-18 RX ADMIN — SODIUM CHLORIDE PRN ML: 9 INJECTION, SOLUTION INTRAMUSCULAR; INTRAVENOUS; SUBCUTANEOUS at 08:14

## 2019-03-18 RX ADMIN — SUCRALFATE SCH GM: 1 TABLET ORAL at 10:56

## 2019-03-18 RX ADMIN — METOPROLOL TARTRATE SCH MG: 25 TABLET ORAL at 08:13

## 2019-03-18 RX ADMIN — DOCUSATE SODIUM SCH MG: 100 CAPSULE, LIQUID FILLED ORAL at 08:13

## 2019-03-18 RX ADMIN — FLUOXETINE SCH MG: 10 CAPSULE ORAL at 08:12

## 2019-03-18 RX ADMIN — SODIUM CHLORIDE SCH MG: 0.9 INJECTION, SOLUTION INTRAVENOUS at 08:13

## 2019-03-18 RX ADMIN — SUCRALFATE SCH GM: 1 TABLET ORAL at 08:12

## 2019-03-18 RX ADMIN — LEVOTHYROXINE, LIOTHYRONINE SCH MG: 19; 4.5 TABLET ORAL at 06:26

## 2019-03-18 NOTE — P.DS
Admission Date: 03/08/19


Discharge Date: 03/18/19


Primary Care Provider: Cardiology-Dr. Owens


Disposition: DC HOME/HOME HEALTH CARE


Discharge Condition: GOOD


Reason for Admission:  hematochesia, duodenal ulcer


Consultations: 





Surgery-Dr. Seymour


GI-Dr. Raines


Neurology-Dr. Jeronimo





Procedures: 





CT head: 


COMPARISON:  None 


TECHNIQUE:  Computed axial tomography of the head was obtained. IV contrast was 

not requested. 


All CT scans are performed using dose optimization technique as appropriate and 

may include automated exposure control or mA/KV adjustment according to patient 

size. 


FINDINGS:  An intracranial  bleed is not seen . 


The ventricles are normal in caliber. Vascular calcifications 


No extra-axial fluid collection is noted. Moderate to marked low-density areas 

within periventricular, deep and subcortical white matter likely represent 

ischemic changes secondary to small vessel disease. 


Fluid within the sinuses/ mastoids is not seen. 


IMPRESSION:  No acute intracranial abnormality is seen.





Neck CT angio: 


COMPARISON:  None 


TECHNIQUE:  50 cc Isovue 370 was administered intravenously. 3D MIP 

reconstruction performed 


All CT scans are performed using dose optimization technique as appropriate and 

may include automated exposure control or mA/KV adjustment according to patient 

size. 


FINDINGS:   Severe calcified plaque is present within the right carotid bulb. . 


Moderate plaque is present within the right external carotid artery. 


Mild plaque is present within common carotid and left internal carotid 

arteries. 


The left vertebral artery is dominant. Distal left vertebral artery is 

calcified. 


IMPRESSION:   Severe stenosis right carotid bulb.





Brain CT angio: 


COMPARISON:  None 


TECHNIQUE:  CT angiogram of the head was obtained. 3D MIPS reconstruction 

performed. 


All CT scans are performed using dose optimization technique as appropriate and 

may include automated exposure control or mA/KV adjustment according to patient 

size. 


FINDINGS:  Coarse calcifications are present within the distal internal carotid 

arteries bilaterally. 


The basilar, anterior cerebral, middle cerebral and posterior cerebral arteries 

are normal caliber. An aneurysm is not seen. 


A significant stenosis is not noted. 


IMPRESSION:  A significant stenosis/ occlusion is not seen





EEG: 


Normal EEG





Carotid Doppler:


FINDINGS:  Normal high resistance waveforms are noted in both external carotid 

arteries. The common carotid arteries and internal carotid arteries show normal 

low resistance waveforms. 


Patient shows very pronounced calcified plaquing change in the right carotid 

bulb extending into the proximal portions of the right internal carotid artery. 

Significant luminal narrowing is evident on visual inspection. Calcified 

plaquing in the left carotid bulb is present without significant luminal 

narrowing. No dissection is identified. Right internal carotid artery peak 

systolic velocity reaches 213 cm/second with a 53 cm/second end-diastolic 

velocity. A 2.5 right-side ICA/CCA ratio was obtained. Right external carotid 

velocity reached 285 cm/second although an external stenosis is generally not 

clinically significant. Left-sided carotid peak velocity reaches 117 cm/

seconds. A 1.1 left ICA/ CCA ratio was obtained. Left external carotid velocity 

is elevated at 222 cm/second. 


Antegrade flow seen in both vertebral arteries. 


Velocity values and ratios were recorded and are retained in the patient's 

imaging records. 


IMPRESSION:  Significant densely calcified plaquing changes in the right 

carotid bulb and proximal ICA. Stenosis is estimated at 80- 90%. 


Left-sided carotid bulb calcified plaquing changes not causing significant 

stenosis. 


Bilateral external carotid stenoses. External carotid stenoses are generally 

not clinically significant.





ECHO: 


EF 51%


LEFT VENTRICULAR WALL MOTION:     NORMAL


DOPPLER/COLOR FLOW:     TRACE TRICUSPID REGURGITATION. 


COMMENTS:      NORMAL LEFT VENTRICULAR SIZE AND FUNCTION. NO WALL MOTION 

ABNORMALITY. MITRAL ANNULAR CALCIFICATION. AORTIC SCLEROSIS.





Procedure: 


Findings:  Esophagus:  In the distal esophagus, a small hiatal hernia with some 

LA grade A esophagitis was seen.  Stomach:  As soon as the scope entered the 

stomach, there was significant amount of retained material, mostly coffee-ground

, with some clots that was visualized.  Significant time was spent with washing 

and suctioning.  We actually switched the scopes from the EGD to a colon scope 

with a much wider suction channel, so that that can be aided.  After 

significant washing and suctioning, no gross gastric lesion was seen that could 

explain the bleeding.  Subsequently, attention was directed to the duodenum.  

At the pylorus, there was a large clot seen, that was actually occupying the 

whole duodenal bowel.  With significant suctioning, this was able to be 

eventually cleared and removed.  In the distal bulb at an acute angle, a large 

cratered ulcer was seen with a large area of large visible vessel.  First, we 

went further into the second part of the duodenum.  No other lesion was seen 

except for just old blood.  Subsequently, the scope was withdrawn and attention 

was focused to this bleeding duodenal ulcer.  I first injected 5 cc of 

epinephrine around the ulcer margins, which stopped the bleeding and I could 

see the vessel in detail.  Initially, clips were attempted.  However, the ulcer 

margin was quite wide and due to the angle, clips only could be placed at the 

edges.  Therefore, subsequently, I used a gold probe and was able to cauterized 

the vessel with good success rate and coagulation.  We spent 5 minutes more 

just to ensure there was no recurrence of bleeding and then withdrew the scope.


Complications:  None.


Tolerance To Anesthesia:  Excellent.


Postoperative Diagnoses:  Hiatal hernia, esophagitis, gastritis, and large 

duodenal ulcer with bleeding, status post treatment.





Medical Problem List: 


Acute blood-loss secondary to acute GI bleed related to gastrojejunal ulcer 

status post EGD and cauterization complicated with hiatal hernia, esophagitis 

and gastritis


Chronic renal disease, stage III


Right-sided hemiparesis, expressive aphasia and falls secondary to TIA with 

noted severe carotid stenosis to the right carotid bulb estimated at 80-90%


Hypertension


Hyperlipidemia


Hypothyroidism


Depression





Brief History of Present Illness: 





85-year-old  female with history of TIA and hypertension reported 

falling multiple times.  When the patient was evaluated by EMS, patient found 

to have right-sided raymond paresis and expressive aphasia.  This resolved before 

evaluation in the emergency room.  Patient also reported melena.  In the ER 

severe stenosis to the right carotid bulb and anemia were noted. Patient was 

admitted for further evaluation and treatment.


Hospital Course: 





Patient presented with acute blood loss secondary to GI bleed.  During the 

course of her stay patient was evaluated by GI and required blood transfusion 

with EGD evaluation.  Patient found to have gas showed vaginal ulcer.  

Cauterization was required.  Patient also found to have hiatal hernia, 

esophagitis and gastritis.  During the course of her stay there was a 

possibility of transfer for interventional radiology and embolization of the 

ulcer if she continued to have anemia.  This was not required.  Patient 

remained stable during the course of her stay.  Patient was restarted on 

aspirin and Plavix for TIA present upon admission.  Plavix has been held due to 

her GI bleeding.  At discharge she will continue only with aspirin 81 mg daily 

for her TIA.  Plavix can be restarted in 4 weeks after evaluation by GI.  

Patient will need a follow up with GI within 1-2 weeks.  Patient will likely 

require pill camera and colonoscopy for further evaluation.  Patient may also 

require repeat EGD to monitor stability of ulcer.  Recommend to recheck lab-CBC 

in 1-2 weeks to monitor progress.  Recommend to follow up with her PCP to 

closely monitor.  At discharge patient will also continue with Protonix 40 mg 1 

pill twice daily and Carafate 1 g with meals.  Recommend to recheck lab-CBC in 1

-2 weeks to follow her progress.





Patient also presented with right-sided hemipareses, expressive aphasia and 

fall.  This was secondary to TIA.  This had resolved prior to admission.  

Patient found to have severe carotid stenosis to the right carotid bulb 

estimated at 80-90%.  Patient previously on aspirin and Plavix was initiated.  

Plavix was discontinued due to GI bleed.  Transfer for CV surgery evaluation 

was attempted but CV surgery recommended this to be further evaluated as an 

outpatient.  Patient seen and evaluated by neurology.  Neurology recommended 

aspirin and Plavix but due to her GI bleed, medications were held until GI 

bleed was under control.  Once GI bleed was under control, aspirin was 

initiated.  At discharge she will continue with aspirin 81 mg daily.  Plavix 

can be considered to be restarted at 4 weeks.  Patient will need to be seen by 

GI and neurology as an outpatient to further consider.  Patient will continue 

with home health and physical therapy at discharge. Recommended follow up with 

neurology in 1-2 weeks to follow up this hospitalization.  Patient will 

continue with multi vitamin daily.  Recommend to recheck lab-CBC in 1-2 weeks 

to follow her progress.





Patient with hypertension.  Medications adjusted during her stay.  Losartan was 

discontinued due to her chronic renal disease. At discharge patient will 

continue with Norvasc 10 mg daily, metoprolol 25 mg daily and hydralazine 25 mg 

1 pill twice daily.  Hydralazine will be a new medication for the patient. 

Recommend to maintain blood pressures less 150/80.  Further adjustment can be 

done by her PCP.





Patient with hyperlipidemia.  Lipitor was added due to her TIA.  Patient will 

continue with medication-Lipitor 40 mg daily.





Patient with hypothyroidism.  Patient will continue with her medication-[place] 

Thyroid 90 mg daily.





Patient with depression.  Patient will continue with her medication-Prozac 10 

mg daily.





Patient with chronic renal disease, stage III.  Losartan was discontinued 

during her stay.  Recommend to follow up with nephrology in 1-2 weeks to 

monitor progress.  Recommend no further use of nonsteroidal anti-

inflammatories.  Future medications will need to be renally dosed.  Recommend 

to recheck lab-BMP in 1-2 weeks to monitor her progress.


Vital Signs/Physical Exam: 














Temp Pulse Resp BP Pulse Ox


 


 98.3 F   67   16   174/72 H  96 


 


 03/18/19 08:00  03/18/19 08:13  03/18/19 08:00  03/18/19 08:13  03/18/19 08:00








General: Alert, In no apparent distress, Oriented x3, Cooperative


HEENT: Atraumatic


Neck: Supple


Respiratory: Clear to auscultation bilaterally, Normal air movement


Cardiovascular: Normal pulses, Regular rate/rhythm


Gastrointestinal: Normal bowel sounds, Soft and benign, Non-distended, No masses

, No rebound, No guarding


Musculoskeletal: No erythema, No tenderness, No warmth


Integumentary: No tenderness/swelling, No erythema, No warmth, No cyanosis


Neurological: Normal speech, Normal strength at 5/5 x4 extr, Normal tone, 

Normal affect


Laboratory Data at Discharge: 














WBC  7.7 K/uL (4.3-10.9)   03/18/19  06:06    


 


Hgb  8.9 g/dL (12.0-15.0)  L  03/18/19  06:06    


 


Hct  25.8 % (36.0-45.0)  L  03/18/19  06:06    


 


Plt Count  533 K/uL (152-406)  H  03/18/19  06:06    


 


PT  11.5 SECONDS (9.5-12.5)   03/06/19  19:02    


 


INR  0.97   03/06/19  19:02    


 


APTT  26.7 SECONDS (24.3-36.9)   03/06/19  19:02    


 


Sodium  138 mmol/L (136-145)   03/18/19  06:06    


 


Potassium  4.4 mmol/L (3.5-5.1)   03/18/19  06:06    


 


BUN  10 mg/dL (7-18)   03/18/19  06:06    


 


Creatinine  1.32 mg/dL (0.55-1.3)  H  03/18/19  06:06    


 


Glucose  99 mg/dL ()   03/18/19  06:06    


 


Magnesium  2.0 mg/dL (1.8-2.4)   03/18/19  06:06    


 


Total Bilirubin  0.4 mg/dL (0.2-1.0)   03/15/19  06:13    


 


AST  12 U/L (15-37)  L  03/15/19  06:13    


 


ALT  11 U/L (12-78)  L  03/15/19  06:13    


 


Alkaline Phosphatase  62 U/L ()   03/15/19  06:13    








Home Medications: 








Amlodipine [Norvasc*] 10 mg PO DAILY 03/08/19 


Aspirin [Aspirin EC 81 MG] 1 tab PO DAILY 03/08/19 


B Complex with Vitamin C [B-Complex Plus Vitamin C] 1 tab PO DAILY 03/08/19 


Fluoxetine HCl [Prozac*] 1 cap PO DAILY 03/08/19 


Metoprolol Tartrate [Lopressor*] 25 mg PO DAILY 03/08/19 


Multivits Min/Iron/FA/Herb#186 [Hair, Skin and Nails Caplet] 1 tab PO DAILY 03/ 08/19 


Thyroid,Pork [Thyroid] 1 tab PO DAILY 03/08/19 


Tramadol HCl [Ultram] 1 tab PO DAILYPRN PRN 03/08/19 


Atorvastatin Calcium [Lipitor] 40 mg PO BEDTIME #30 tab 03/18/19 


Docusate [Colace Cap*] 100 mg PO BID #60 cap 03/18/19 


Hydralazine HCl 25 mg PO BID #60 tablet 03/18/19 


Pantoprazole [Protonix  Tab] 40 mg PO BID #60 tab 03/18/19 


Sucralfate [Carafate*] 1 gm PO ACHS #90 tab 03/18/19 





New Medications: 


Atorvastatin Calcium [Lipitor] 40 mg PO BEDTIME #30 tab


Docusate [Colace Cap*] 100 mg PO BID #60 cap


Hydralazine HCl 25 mg PO BID #60 tablet


Pantoprazole [Protonix  Tab] 40 mg PO BID #60 tab


Sucralfate [Carafate*] 1 gm PO ACHS #90 tab


Patient Discharge Instructions: 1.  Patient will follow up with her PCP in 1 

week to follow up this hospitalization.  Patient will continue with home health 

and physical therapy at discharge.  2.  Patient presented with acute blood loss 

secondary to GI bleed.  During the course of her stay patient was evaluated by 

GI and required blood transfusion with EGD evaluation.  Patient found to have 

gas showed vaginal ulcer.  Cauterization was required.  Patient also found to 

have hiatal hernia, esophagitis and gastritis.  During the course of her stay 

there was a possibility of transfer for interventional radiology and 

embolization of the ulcer if she continued to have anemia.  This was not 

required.  Patient remained stable during the course of her stay.  Patient was 

restarted on aspirin and Plavix for TIA present upon admission.  Plavix has 

been held due to her GI bleeding.  At discharge she will continue only with 

aspirin 81 mg daily for her TIA.  Plavix can be restarted in 4 weeks after 

evaluation by GI.  Patient will need a follow up with GI within 1-2 weeks.  

Patient will likely require pill camera and colonoscopy for further evaluation.

  Patient may also require repeat EGD to monitor stability of ulcer.  Recommend 

to recheck lab-CBC in 1-2 weeks to monitor progress.  Recommend to follow up 

with her PCP to closely monitor.  At discharge patient will also continue with 

Protonix 40 mg 1 pill twice daily and Carafate 1 g with meals.  Recommend to 

recheck lab-CBC in 1-2 weeks to follow her progress.  3.  Patient also 

presented with right-sided hemipareses, expressive aphasia and fall.  This was 

secondary to TIA.  This had resolved prior to admission.  Patient found to have 

severe carotid stenosis to the right carotid bulb estimated at 80-90%.  Patient 

previously on aspirin and Plavix was initiated.  Plavix was discontinued due to 

GI bleed.  Transfer for CV surgery evaluation was attempted but CV surgery 

recommended this to be further evaluated as an outpatient.  Patient seen and 

evaluated by neurology.  Neurology recommended aspirin and Plavix but due to 

her GI bleed, medications were held until GI bleed was under control.  Once GI 

bleed was under control, aspirin was initiated.  At discharge she will continue 

with aspirin 81 mg daily.  Plavix can be considered to be restarted at 4 weeks.

  Patient will need to be seen by GI and neurology as an outpatient to further 

consider.  Patient will continue with home health and physical therapy at 

discharge. Recommended follow up with neurology in 1-2 weeks to follow up this 

hospitalization.  Patient will continue with multi vitamin daily.  Recommend to 

recheck lab-CBC in 1-2 weeks to follow her progress.  4.  Patient with 

hypertension.  Medications adjusted during her stay.  Losartan was discontinued 

due to her chronic renal disease. At discharge patient will continue with 

Norvasc 10 mg daily, metoprolol 25 mg daily and hydralazine 25 mg 1 pill twice 

daily.  Hydralazine will be a new medication for the patient. Recommend to 

maintain blood pressures less 150/80.  Further adjustment can be done by her 

PCP.  5.  Patient with hyperlipidemia.  Lipitor was added due to her TIA.  

Patient will continue with medication-Lipitor 40 mg daily.  6.  Patient with 

hypothyroidism.  Patient will continue with her medication-[place] Thyroid 90 mg 

daily.  7.  Patient with depression.  Patient will continue with her medication-

Prozac 10 mg daily.  8.  Patient with chronic renal disease, stage III.  

Losartan was discontinued during her stay.  Recommend to follow up with 

nephrology in 1-2 weeks to monitor progress.  Recommend no further use of 

nonsteroidal anti-inflammatories.  Future medications will need to be renally 

dosed.  Recommend to recheck lab-BMP in 1-2 weeks to monitor her progress.


Diet: Renal


Activity: Fall precautions


Time spent managing pt's care (in minutes): 55